# Patient Record
Sex: MALE | Race: WHITE | Employment: OTHER | URBAN - NONMETROPOLITAN AREA
[De-identification: names, ages, dates, MRNs, and addresses within clinical notes are randomized per-mention and may not be internally consistent; named-entity substitution may affect disease eponyms.]

---

## 2017-01-09 ENCOUNTER — HOSPITAL ENCOUNTER (OUTPATIENT)
Dept: LAB | Age: 77
Discharge: HOME | End: 2017-01-31
Attending: INTERNAL MEDICINE | Admitting: INTERNAL MEDICINE

## 2017-01-09 LAB
ALBUMIN SERPL-MCNC: 3.8 GM/DL (ref 3.4–5)
ALP BLD-CCNC: 59 IU/L (ref 40–129)
ALT SERPL-CCNC: 17 U/L (ref 10–40)
AST SERPL-CCNC: 16 IU/L (ref 15–37)
BASOPHILS ABSOLUTE: 0.1 K/CU MM
BASOPHILS RELATIVE PERCENT: 1.1 % (ref 0–1)
BILIRUB SERPL-MCNC: 0.3 MG/DL (ref 0–1)
BILIRUBIN DIRECT: 0.2 MG/DL (ref 0–0.3)
BILIRUBIN, INDIRECT: 0.1 MG/DL (ref 0–0.7)
CREAT SERPL-MCNC: 1.3 MG/DL (ref 0.9–1.3)
DIFFERENTIAL TYPE: ABNORMAL
EOSINOPHILS ABSOLUTE: 0.4 K/CU MM
EOSINOPHILS RELATIVE PERCENT: 5 % (ref 0–3)
GFR AFRICAN AMERICAN: >60 ML/MIN/1.73M2
GFR NON-AFRICAN AMERICAN: 53 ML/MIN/1.73M2
HCT VFR BLD CALC: 41.5 % (ref 42–52)
HEMOGLOBIN: 13.1 GM/DL (ref 13.5–18)
IMMATURE NEUTROPHIL %: 0.3 % (ref 0–0.43)
LYMPHOCYTES ABSOLUTE: 1.2 K/CU MM
LYMPHOCYTES RELATIVE PERCENT: 17.4 % (ref 24–44)
MCH RBC QN AUTO: 31.8 PG (ref 27–31)
MCHC RBC AUTO-ENTMCNC: 31.6 % (ref 32–36)
MCV RBC AUTO: 100.7 FL (ref 78–100)
MONOCYTES ABSOLUTE: 0.6 K/CU MM
MONOCYTES RELATIVE PERCENT: 8.2 % (ref 0–4)
PDW BLD-RTO: 13.6 % (ref 11.7–14.9)
PLATELET # BLD: 157 K/CU MM (ref 140–440)
PMV BLD AUTO: 9.8 FL (ref 7.5–11.1)
RBC # BLD: 4.12 M/CU MM (ref 4.6–6.2)
SEGMENTED NEUTROPHILS ABSOLUTE COUNT: 4.8 K/CU MM
SEGMENTED NEUTROPHILS RELATIVE PERCENT: 68 % (ref 36–66)
TOTAL IMMATURE NEUTOROPHIL: 0.02 K/CU MM
TOTAL PROTEIN: 6.8 GM/DL (ref 6.4–8.2)
WBC # BLD: 7.1 K/CU MM (ref 4–10.5)

## 2017-02-01 ENCOUNTER — HOSPITAL ENCOUNTER (OUTPATIENT)
Dept: LAB | Age: 77
Discharge: HOME | End: 2017-02-28
Attending: INTERNAL MEDICINE | Admitting: INTERNAL MEDICINE

## 2017-03-03 ENCOUNTER — HOSPITAL ENCOUNTER (OUTPATIENT)
Dept: LAB | Age: 77
Discharge: HOME | End: 2017-03-31
Attending: INTERNAL MEDICINE | Admitting: INTERNAL MEDICINE

## 2017-03-03 LAB
ALBUMIN SERPL-MCNC: 3.8 GM/DL (ref 3.4–5)
ALP BLD-CCNC: 62 IU/L (ref 40–129)
ALT SERPL-CCNC: 14 U/L (ref 10–40)
AST SERPL-CCNC: 18 IU/L (ref 15–37)
BASOPHILS ABSOLUTE: 0.1 K/CU MM
BASOPHILS RELATIVE PERCENT: 0.6 % (ref 0–1)
BILIRUB SERPL-MCNC: 0.7 MG/DL (ref 0–1)
BILIRUBIN DIRECT: 0.2 MG/DL (ref 0–0.3)
BILIRUBIN, INDIRECT: 0.5 MG/DL (ref 0–0.7)
CREAT SERPL-MCNC: 1.1 MG/DL (ref 0.9–1.3)
DIFFERENTIAL TYPE: ABNORMAL
EOSINOPHILS ABSOLUTE: 0.3 K/CU MM
EOSINOPHILS RELATIVE PERCENT: 3.2 % (ref 0–3)
GFR AFRICAN AMERICAN: >60 ML/MIN/1.73M2
GFR NON-AFRICAN AMERICAN: >60 ML/MIN/1.73M2
HCT VFR BLD CALC: 42.8 % (ref 42–52)
HEMOGLOBIN: 13.4 GM/DL (ref 13.5–18)
IMMATURE NEUTROPHIL %: 0.4 % (ref 0–0.43)
LYMPHOCYTES ABSOLUTE: 1 K/CU MM
LYMPHOCYTES RELATIVE PERCENT: 11.1 % (ref 24–44)
MCH RBC QN AUTO: 30.7 PG (ref 27–31)
MCHC RBC AUTO-ENTMCNC: 31.3 % (ref 32–36)
MCV RBC AUTO: 98.2 FL (ref 78–100)
MONOCYTES ABSOLUTE: 0.7 K/CU MM
MONOCYTES RELATIVE PERCENT: 8 % (ref 0–4)
PDW BLD-RTO: 13.5 % (ref 11.7–14.9)
PLATELET # BLD: 163 K/CU MM (ref 140–440)
PMV BLD AUTO: 9.9 FL (ref 7.5–11.1)
RBC # BLD: 4.36 M/CU MM (ref 4.6–6.2)
SEGMENTED NEUTROPHILS ABSOLUTE COUNT: 6.6 K/CU MM
SEGMENTED NEUTROPHILS RELATIVE PERCENT: 76.7 % (ref 36–66)
TOTAL IMMATURE NEUTOROPHIL: 0.03 K/CU MM
TOTAL PROTEIN: 6.7 GM/DL (ref 6.4–8.2)
WBC # BLD: 8.5 K/CU MM (ref 4–10.5)

## 2017-04-01 ENCOUNTER — HOSPITAL ENCOUNTER (OUTPATIENT)
Dept: LAB | Age: 77
Discharge: HOME | End: 2017-04-30
Attending: INTERNAL MEDICINE | Admitting: INTERNAL MEDICINE

## 2017-04-10 LAB
ALBUMIN SERPL-MCNC: 3.7 GM/DL (ref 3.4–5)
ALP BLD-CCNC: 57 IU/L (ref 40–129)
ALT SERPL-CCNC: 15 U/L (ref 10–40)
AST SERPL-CCNC: 16 IU/L (ref 15–37)
BASOPHILS ABSOLUTE: 0.1 K/CU MM
BASOPHILS RELATIVE PERCENT: 0.6 % (ref 0–1)
BILIRUB SERPL-MCNC: 0.4 MG/DL (ref 0–1)
BILIRUBIN DIRECT: 0.2 MG/DL (ref 0–0.3)
BILIRUBIN, INDIRECT: 0.2 MG/DL (ref 0–0.7)
CREAT SERPL-MCNC: 1.1 MG/DL (ref 0.9–1.3)
DIFFERENTIAL TYPE: ABNORMAL
EOSINOPHILS ABSOLUTE: 0.2 K/CU MM
EOSINOPHILS RELATIVE PERCENT: 2.9 % (ref 0–3)
GFR AFRICAN AMERICAN: >60 ML/MIN/1.73M2
GFR NON-AFRICAN AMERICAN: >60 ML/MIN/1.73M2
HCT VFR BLD CALC: 43.2 % (ref 42–52)
HEMOGLOBIN: 13.6 GM/DL (ref 13.5–18)
IMMATURE NEUTROPHIL %: 0.6 % (ref 0–0.43)
LYMPHOCYTES ABSOLUTE: 0.9 K/CU MM
LYMPHOCYTES RELATIVE PERCENT: 11.3 % (ref 24–44)
MCH RBC QN AUTO: 31 PG (ref 27–31)
MCHC RBC AUTO-ENTMCNC: 31.5 % (ref 32–36)
MCV RBC AUTO: 98.4 FL (ref 78–100)
MONOCYTES ABSOLUTE: 0.5 K/CU MM
MONOCYTES RELATIVE PERCENT: 6.7 % (ref 0–4)
PDW BLD-RTO: 13.8 % (ref 11.7–14.9)
PLATELET # BLD: 144 K/CU MM (ref 140–440)
PMV BLD AUTO: 9.6 FL (ref 7.5–11.1)
RBC # BLD: 4.39 M/CU MM (ref 4.6–6.2)
SEGMENTED NEUTROPHILS ABSOLUTE COUNT: 6.1 K/CU MM
SEGMENTED NEUTROPHILS RELATIVE PERCENT: 77.9 % (ref 36–66)
TOTAL IMMATURE NEUTOROPHIL: 0.05 K/CU MM
TOTAL PROTEIN: 6.3 GM/DL (ref 6.4–8.2)
WBC # BLD: 7.9 K/CU MM (ref 4–10.5)

## 2017-05-01 ENCOUNTER — HOSPITAL ENCOUNTER (OUTPATIENT)
Dept: LAB | Age: 77
Discharge: HOME | End: 2017-05-31
Attending: INTERNAL MEDICINE | Admitting: INTERNAL MEDICINE

## 2017-07-07 ENCOUNTER — HOSPITAL ENCOUNTER (OUTPATIENT)
Dept: LAB | Age: 77
Discharge: HOME | End: 2017-07-31
Attending: INTERNAL MEDICINE | Admitting: INTERNAL MEDICINE

## 2017-07-07 ENCOUNTER — HOSPITAL ENCOUNTER (OUTPATIENT)
Dept: LAB | Age: 77
Discharge: HOME | End: 2017-07-07
Attending: FAMILY MEDICINE | Admitting: FAMILY MEDICINE

## 2017-07-07 LAB
ALBUMIN SERPL-MCNC: 3.7 GM/DL (ref 3.4–5)
ALP BLD-CCNC: 58 IU/L (ref 40–129)
ALT SERPL-CCNC: 21 U/L (ref 10–40)
AST SERPL-CCNC: 21 IU/L (ref 15–37)
BASOPHILS ABSOLUTE: 0 K/CU MM
BASOPHILS RELATIVE PERCENT: 0.4 % (ref 0–1)
BILIRUB SERPL-MCNC: 0.5 MG/DL (ref 0–1)
BILIRUBIN DIRECT: 0.2 MG/DL (ref 0–0.3)
BILIRUBIN, INDIRECT: 0.3 MG/DL (ref 0–0.7)
CREAT SERPL-MCNC: 1.2 MG/DL (ref 0.9–1.3)
DIFFERENTIAL TYPE: ABNORMAL
EOSINOPHILS ABSOLUTE: 0.2 K/CU MM
EOSINOPHILS RELATIVE PERCENT: 2.9 % (ref 0–3)
GFR AFRICAN AMERICAN: >60 ML/MIN/1.73M2
GFR NON-AFRICAN AMERICAN: 58 ML/MIN/1.73M2
HCT VFR BLD CALC: 41.5 % (ref 42–52)
HEMOGLOBIN: 13.2 GM/DL (ref 13.5–18)
IMMATURE NEUTROPHIL %: 0.6 % (ref 0–0.43)
LYMPHOCYTES ABSOLUTE: 0.5 K/CU MM
LYMPHOCYTES RELATIVE PERCENT: 9.4 % (ref 24–44)
MCH RBC QN AUTO: 31.2 PG (ref 27–31)
MCHC RBC AUTO-ENTMCNC: 31.8 % (ref 32–36)
MCV RBC AUTO: 98.1 FL (ref 78–100)
MONOCYTES ABSOLUTE: 0.5 K/CU MM
MONOCYTES RELATIVE PERCENT: 9.4 % (ref 0–4)
PDW BLD-RTO: 14 % (ref 11.7–14.9)
PLATELET # BLD: 118 K/CU MM (ref 140–440)
PMV BLD AUTO: 10.6 FL (ref 7.5–11.1)
PROSTATE SPECIFIC ANTIGEN: 0.82 NG/ML (ref 0–4)
RBC # BLD: 4.23 M/CU MM (ref 4.6–6.2)
SEGMENTED NEUTROPHILS ABSOLUTE COUNT: 4.2 K/CU MM
SEGMENTED NEUTROPHILS RELATIVE PERCENT: 77.3 % (ref 36–66)
TOTAL IMMATURE NEUTOROPHIL: 0.03 K/CU MM
TOTAL PROTEIN: 6.6 GM/DL (ref 6.4–8.2)
WBC # BLD: 5.4 K/CU MM (ref 4–10.5)

## 2017-08-01 ENCOUNTER — HOSPITAL ENCOUNTER (OUTPATIENT)
Dept: LAB | Age: 77
Discharge: HOME | End: 2017-08-31
Attending: INTERNAL MEDICINE | Admitting: INTERNAL MEDICINE

## 2017-09-19 ENCOUNTER — HOSPITAL ENCOUNTER (OUTPATIENT)
Dept: LAB | Age: 77
Discharge: HOME | End: 2017-09-30
Attending: INTERNAL MEDICINE | Admitting: INTERNAL MEDICINE

## 2017-09-19 LAB
ALBUMIN SERPL-MCNC: 3.8 GM/DL (ref 3.4–5)
ALP BLD-CCNC: 62 IU/L (ref 40–129)
ALT SERPL-CCNC: 19 U/L (ref 10–40)
AST SERPL-CCNC: 21 IU/L (ref 15–37)
BASOPHILS ABSOLUTE: 0.1 K/CU MM
BASOPHILS RELATIVE PERCENT: 0.7 % (ref 0–1)
BILIRUB SERPL-MCNC: 0.5 MG/DL (ref 0–1)
BILIRUBIN DIRECT: 0.2 MG/DL (ref 0–0.3)
BILIRUBIN, INDIRECT: 0.3 MG/DL (ref 0–0.7)
CREAT SERPL-MCNC: 1.1 MG/DL (ref 0.9–1.3)
DIFFERENTIAL TYPE: ABNORMAL
EOSINOPHILS ABSOLUTE: 0.5 K/CU MM
EOSINOPHILS RELATIVE PERCENT: 6.6 % (ref 0–3)
GFR AFRICAN AMERICAN: >60 ML/MIN/1.73M2
GFR NON-AFRICAN AMERICAN: >60 ML/MIN/1.73M2
HCT VFR BLD CALC: 41.1 % (ref 42–52)
HEMOGLOBIN: 13 GM/DL (ref 13.5–18)
IMMATURE NEUTROPHIL %: 0.4 % (ref 0–0.43)
LYMPHOCYTES ABSOLUTE: 0.8 K/CU MM
LYMPHOCYTES RELATIVE PERCENT: 10.4 % (ref 24–44)
MCH RBC QN AUTO: 31 PG (ref 27–31)
MCHC RBC AUTO-ENTMCNC: 31.6 % (ref 32–36)
MCV RBC AUTO: 98.1 FL (ref 78–100)
MONOCYTES ABSOLUTE: 0.6 K/CU MM
MONOCYTES RELATIVE PERCENT: 7.5 % (ref 0–4)
PDW BLD-RTO: 14 % (ref 11.7–14.9)
PLATELET # BLD: 135 K/CU MM (ref 140–440)
PMV BLD AUTO: 10.2 FL (ref 7.5–11.1)
RBC # BLD: 4.19 M/CU MM (ref 4.6–6.2)
SEGMENTED NEUTROPHILS ABSOLUTE COUNT: 5.6 K/CU MM
SEGMENTED NEUTROPHILS RELATIVE PERCENT: 74.4 % (ref 36–66)
TOTAL IMMATURE NEUTOROPHIL: 0.03 K/CU MM
TOTAL PROTEIN: 6.8 GM/DL (ref 6.4–8.2)
WBC # BLD: 7.6 K/CU MM (ref 4–10.5)

## 2017-10-01 ENCOUNTER — HOSPITAL ENCOUNTER (OUTPATIENT)
Dept: LAB | Age: 77
Discharge: HOME | End: 2017-10-31
Attending: INTERNAL MEDICINE | Admitting: INTERNAL MEDICINE

## 2017-12-20 ENCOUNTER — HOSPITAL ENCOUNTER (OUTPATIENT)
Dept: LAB | Age: 77
Discharge: HOME | End: 2017-12-31
Attending: INTERNAL MEDICINE | Admitting: INTERNAL MEDICINE

## 2017-12-20 LAB
ALBUMIN SERPL-MCNC: 3.9 GM/DL (ref 3.4–5)
ALP BLD-CCNC: 61 IU/L (ref 40–129)
ALT SERPL-CCNC: 15 U/L (ref 10–40)
AST SERPL-CCNC: 18 IU/L (ref 15–37)
BASOPHILS ABSOLUTE: 0.1 K/CU MM
BASOPHILS RELATIVE PERCENT: 1 % (ref 0–1)
BILIRUB SERPL-MCNC: 0.6 MG/DL (ref 0–1)
BILIRUBIN DIRECT: 0.2 MG/DL (ref 0–0.3)
BILIRUBIN, INDIRECT: 0.4 MG/DL (ref 0–0.7)
CREAT SERPL-MCNC: 1.1 MG/DL (ref 0.9–1.3)
DIFFERENTIAL TYPE: ABNORMAL
EOSINOPHILS ABSOLUTE: 0.5 K/CU MM
EOSINOPHILS RELATIVE PERCENT: 6.3 % (ref 0–3)
GFR AFRICAN AMERICAN: >60 ML/MIN/1.73M2
GFR NON-AFRICAN AMERICAN: >60 ML/MIN/1.73M2
HCT VFR BLD CALC: 43 % (ref 42–52)
HEMOGLOBIN: 13.9 GM/DL (ref 13.5–18)
IMMATURE NEUTROPHIL %: 0.3 % (ref 0–0.43)
LYMPHOCYTES ABSOLUTE: 1.1 K/CU MM
LYMPHOCYTES RELATIVE PERCENT: 15.1 % (ref 24–44)
MCH RBC QN AUTO: 31.4 PG (ref 27–31)
MCHC RBC AUTO-ENTMCNC: 32.3 % (ref 32–36)
MCV RBC AUTO: 97.1 FL (ref 78–100)
MONOCYTES ABSOLUTE: 0.6 K/CU MM
MONOCYTES RELATIVE PERCENT: 7.7 % (ref 0–4)
PDW BLD-RTO: 13.7 % (ref 11.7–14.9)
PLATELET # BLD: 148 K/CU MM (ref 140–440)
PMV BLD AUTO: 10.4 FL (ref 7.5–11.1)
RBC # BLD: 4.43 M/CU MM (ref 4.6–6.2)
SEGMENTED NEUTROPHILS ABSOLUTE COUNT: 5.1 K/CU MM
SEGMENTED NEUTROPHILS RELATIVE PERCENT: 69.6 % (ref 36–66)
TOTAL IMMATURE NEUTOROPHIL: 0.02 K/CU MM
TOTAL PROTEIN: 7 GM/DL (ref 6.4–8.2)
WBC # BLD: 7.3 K/CU MM (ref 4–10.5)

## 2018-01-01 ENCOUNTER — HOSPITAL ENCOUNTER (OUTPATIENT)
Dept: LAB | Age: 78
Discharge: HOME | End: 2018-01-31
Attending: INTERNAL MEDICINE | Admitting: INTERNAL MEDICINE

## 2018-06-15 ENCOUNTER — HOSPITAL ENCOUNTER (OUTPATIENT)
Dept: GENERAL RADIOLOGY | Age: 78
Discharge: HOME | End: 2018-06-15
Attending: ORTHOPAEDIC SURGERY | Admitting: ORTHOPAEDIC SURGERY

## 2018-06-15 DIAGNOSIS — M25.511 ACUTE PAIN OF RIGHT SHOULDER: ICD-10-CM

## 2019-02-13 ENCOUNTER — HOSPITAL ENCOUNTER (OUTPATIENT)
Age: 79
Discharge: HOME | End: 2019-02-13
Payer: MEDICARE

## 2019-02-13 LAB
ALBUMIN SERPL-MCNC: 3.8 GM/DL (ref 3.4–5)
ALP BLD-CCNC: 52 IU/L (ref 40–129)
ALT SERPL-CCNC: 19 U/L (ref 10–40)
ANION GAP SERPL CALCULATED.3IONS-SCNC: 10 MMOL/L (ref 4–16)
AST SERPL-CCNC: 18 IU/L (ref 15–37)
BILIRUB SERPL-MCNC: 0.7 MG/DL (ref 0–1)
BUN BLDV-MCNC: 32 MG/DL (ref 6–23)
CALCIUM SERPL-MCNC: 9.2 MG/DL (ref 8.3–10.6)
CHLORIDE BLD-SCNC: 106 MMOL/L (ref 99–110)
CHOLESTEROL, FASTING: 153 MG/DL
CO2: 29 MMOL/L (ref 21–32)
CREAT SERPL-MCNC: 1.1 MG/DL (ref 0.9–1.3)
GFR AFRICAN AMERICAN: >60 ML/MIN/1.73M2
GFR NON-AFRICAN AMERICAN: >60 ML/MIN/1.73M2
GLUCOSE FASTING: 83 MG/DL (ref 70–99)
HDLC SERPL-MCNC: 40 MG/DL
LDL CHOLESTEROL DIRECT: 102 MG/DL
POTASSIUM SERPL-SCNC: 4.4 MMOL/L (ref 3.5–5.1)
SODIUM BLD-SCNC: 145 MMOL/L (ref 135–145)
TOTAL PROTEIN: 6.3 GM/DL (ref 6.4–8.2)
TRIGLYCERIDE, FASTING: 104 MG/DL
TSH HIGH SENSITIVITY: 1.56 UIU/ML (ref 0.27–4.2)

## 2019-02-13 PROCEDURE — 80053 COMPREHEN METABOLIC PANEL: CPT

## 2019-02-13 PROCEDURE — 84443 ASSAY THYROID STIM HORMONE: CPT

## 2019-02-13 PROCEDURE — 36415 COLL VENOUS BLD VENIPUNCTURE: CPT

## 2019-02-13 PROCEDURE — 80061 LIPID PANEL: CPT

## 2019-05-29 ENCOUNTER — HOSPITAL ENCOUNTER (OUTPATIENT)
Dept: SLEEP CENTER | Age: 79
Discharge: HOME | End: 2019-05-29
Payer: MEDICARE

## 2019-05-29 VITALS
HEIGHT: 68 IN | HEART RATE: 75 BPM | SYSTOLIC BLOOD PRESSURE: 124 MMHG | DIASTOLIC BLOOD PRESSURE: 67 MMHG | BODY MASS INDEX: 30.31 KG/M2 | OXYGEN SATURATION: 98 % | WEIGHT: 200 LBS

## 2019-05-29 DIAGNOSIS — G47.33 OSA (OBSTRUCTIVE SLEEP APNEA): Primary | ICD-10-CM

## 2019-05-29 PROCEDURE — 99211 OFF/OP EST MAY X REQ PHY/QHP: CPT | Performed by: PSYCHIATRY & NEUROLOGY

## 2019-05-29 ASSESSMENT — SLEEP AND FATIGUE QUESTIONNAIRES
HOW LIKELY ARE YOU TO NOD OFF OR FALL ASLEEP WHILE WATCHING TV: 2
HOW LIKELY ARE YOU TO NOD OFF OR FALL ASLEEP WHILE SITTING AND READING: 2
HOW LIKELY ARE YOU TO NOD OFF OR FALL ASLEEP WHILE LYING DOWN TO REST IN THE AFTERNOON WHEN CIRCUMSTANCES PERMIT: 3
HOW LIKELY ARE YOU TO NOD OFF OR FALL ASLEEP WHILE SITTING INACTIVE IN A PUBLIC PLACE: 2
HOW LIKELY ARE YOU TO NOD OFF OR FALL ASLEEP WHILE SITTING AND TALKING TO SOMEONE: 1
HOW LIKELY ARE YOU TO NOD OFF OR FALL ASLEEP WHILE SITTING QUIETLY AFTER LUNCH WITHOUT ALCOHOL: 2
HOW LIKELY ARE YOU TO NOD OFF OR FALL ASLEEP IN A CAR, WHILE STOPPED FOR A FEW MINUTES IN TRAFFIC: 1
ESS TOTAL SCORE: 16
HOW LIKELY ARE YOU TO NOD OFF OR FALL ASLEEP WHEN YOU ARE A PASSENGER IN A CAR FOR AN HOUR WITHOUT A BREAK: 3

## 2019-05-29 NOTE — PROGRESS NOTES
Chandana Vega MD, Nabil Coley MD, Colonel Raza POPE, Gemma Huang MD, [facility]      [address], [address] [facility]   [place] [address]: [phone]  F: [phone]     Subjective:     Patient ID: Kelly Tyler is a 78 y.o. male, referred to the sleep center for   Chief Complaint   Patient presents with    Sleep Apnea   . Referring Tania Negro     History:  Snoring, apnea EDS 25-30 yrs. Had PSG 20 yrs ago. Was given CPAP. It worked; slept better. Was not v sleepy during day. His CPAP machine broke 6 wks ago; it is not working.      Social History     Socioeconomic History    Marital status:      Spouse name: Not on file    Number of children: Not on file    Years of education: Not on file    Highest education level: Not on file   Occupational History    Not on file   Social Needs    Financial resource strain: Not on file    Food insecurity:     Worry: Not on file     Inability: Not on file    Transportation needs:     Medical: Not on file     Non-medical: Not on file   Tobacco Use    Smoking status: Never Smoker    Smokeless tobacco: Never Used   Substance and Sexual Activity    Alcohol use: No    Drug use: Not on file    Sexual activity: Not on file   Lifestyle    Physical activity:     Days per week: Not on file     Minutes per session: Not on file    Stress: Not on file   Relationships    Social connections:     Talks on phone: Not on file     Gets together: Not on file     Attends Nondenominational service: Not on file     Active member of club or organization: Not on file     Attends meetings of clubs or organizations: Not on file     Relationship status: Not on file    Intimate partner violence:     Fear of current or ex partner: Not on file     Emotionally abused: Not on file     Physically abused: Not on file     Forced sexual activity: Not on file   Other Topics Concern    Not on file   Social History Narrative    Not on file       Prior to Admission medications    Medication Sig Start Date End Date Taking? Authorizing Provider   HYDROcodone-acetaminophen (NORCO) 5-325 MG per tablet Take 1-2 tablets by mouth every 6 hours as needed for Pain . 7/7/17  Yes Krzysztof Alvarenga DO   tamsulosin (FLOMAX) 0.4 MG capsule Take 0.4 mg by mouth daily   Yes Historical Provider, MD   predniSONE (DELTASONE) 5 MG tablet Take 5 mg by mouth daily   Yes Historical Provider, MD   folic acid (FOLVITE) 1 MG tablet Take 1 mg by mouth daily. Yes Historical Provider, MD   amLODIPine-benazepril (LOTREL) 5-10 MG per capsule Take 1 capsule by mouth daily. Yes Historical Provider, MD   montelukast (SINGULAIR) 10 MG tablet Take 10 mg by mouth nightly. Yes Historical Provider, MD   mometasone (NASONEX) 50 MCG/ACT nasal spray 2 sprays by Nasal route daily. Yes Historical Provider, MD   Ascorbic Acid (VITAMIN C) 500 MG tablet Take 1,000 mg by mouth daily. Yes Historical Provider, MD   Cholecalciferol (VITAMIN D3) 2000 UNITS CAPS Take 1 tablet by mouth. Yes Historical Provider, MD   ammonium lactate (AMLACTIN) 12 % cream Apply  topically as needed. Apply topically as needed. Yes Historical Provider, MD   Calcipotriene-Betameth Diprop (TACLONEX EX) Apply  topically. Applied Saturday and Sunday only    Yes Historical Provider, MD   [facility] Natural Products (OSTEO BI-FLEX JOINT SHIELD PO) Take 2 tablets by mouth daily. Yes Historical Provider, MD   citalopram (CELEXA) 10 MG tablet Take 10 mg by mouth daily    Historical Provider, MD   citalopram (CELEXA) 20 MG tablet  6/28/17   Historical Provider, MD   famotidine (PEPCID) 20 MG tablet Take 1 tablet by mouth 2 times daily for 7 days. 4/17/13 4/24/13  [address] [place] [address] Drive, MD   aspirin 81 MG EC tablet Take 81 mg by mouth daily. Historical Provider, MD   methotrexate 2.5 MG tablet Take 7.5 mg by mouth once a week.       Historical Provider, MD lactase (LACTAID) 3000 UNITS tablet Take 1 tablet by mouth as needed. Historical Provider, MD   ketoconazole (NIZORAL) 2 % shampoo Apply  topically once a week. Apply topically daily as needed. 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 Objective:     Vitals:    05/29/19 1050   BP: 124/67   Pulse: 75   SpO2: 98%   Weight: 200 lb (90.7 kg)   Height: 5' 8\" (1.727 m)     Neck circumference: 16  Inches  Vancleave - Total score: 16    Gen: No distress. Eyes: PERRL. No sclera icterus. No conjunctival injection. ENT: No discharge. Pharynx clear. External appearance of ears and nose normal.  Neck: Trachea midline. No obvious mass. Resp: No accessory muscle use. No crackles. No wheezes. No rhonchi. No dullness on percussion. CV: Regular rate. Regular rhythm. No murmur or rub. No edema. GI: Non-tender. Non-distended. No hernia. Skin: Warm, dry, normal texture and turgor. No nodule on exposed extremities. Lymph: No cervical LAD. No supraclavicular LAD. M/S: No cyanosis. No clubbing. No joint deformity. Psych: Oriented x 3. No anxiety. Awake. Alert. Intact judgement and insight.     Mallampati Airway Classification:   []1 []2 []3 [x]4        Sleep Complaints/Symptoms:    Normal Bedtime:      Normal Wake Time:   Average Sleep Time:  7-8 hrs    Number of Awakenings: 2-4    Duration of Sleep Complaints: 25-30  years    [x] Snoring     [] Improved [] Not Improved    [] Choking/Gasping for Breath  [] Improved [] Not Improved       [x] Witnessed Apneas              [] Improved [] Not Improved  [x] Daytime Sleepiness             [] Improved [] Not Improved  [] Morning Headaches    [] Improved [] Not Improved  [] Frequent Awakenings       [] Improved [] Not Improved  [] Jerky Movements   [] Improved [] Not Improved   [] Restless Legs   [] Improved [] Not Improved   [] Difficulty Initiating Sleep  [] Improved [] Not Improved   [] Difficulty Maintaining Sleep  [] Improved [] Not Improved   [] Restless Sleep    [] Improved [] Not Improved   [] Sleep Paralysis    [] Improved [] Not Improved   [] Muscle Weakness w/ Emotion  [] Improved [] Not Improved  [] Other :     CPAP Usage:    []  Patient has never worn CPAP  [x]  Patient has worn CPAP previously but discontinued use; machinebroke  []  Current PAP user,  [years]   []  Patient Tolerates Well   []  Patient Does Not Tolerate     []  Patient Uses CPAP      []  More Than 4 Hours      []  Less Than 4 Hours  []  CPAP/BPAP/ASV Pressure Readings   []  CPAP Pressure      cm H20   []  BPAP Pressure       cm H20   []  ASV Pressure         cm H20      Assessment:      Diagnosis:     ALFREDA  Hypersomnia    Patient Active Problem List    Diagnosis Date Noted    Abdominal aortic aneurysm without rupture ([facility] [address] 08/10/2016     Plan:        Sleep Study:     [x]  Sleep hygiene/ relaxation methods & CBTi principles review with patient     []  HST - Home Sleep Study   []  PSG - Overnight Diagnostic Polysomnogram     [x]  CPAP Titration    [x] Split Night Study    [] BiLevel Titration    [] ASV - Auto-Servo Ventilation Titration       []  MSLT - Multiple Sleep Latency Test   []  MWT - Maintenance of Wakefulness Test    CPAP Therapy:     []  Patient to be seen for new mask fitting/desensitization   []  AutoPAP Titration    []  CPAP supplies and equipment at ________cmH2O    []  Continue same CPAP pressure   []  Change CPAP pressure to _______cm H2O   []  CPAP supplies only, no pressure change   []  Refer for an oral appliance       Medications:       []  Continue current medication    []  Add Medication:  ________________    Follow-Up:     []  No follow up required. Patient to return as needed. []  2 weeks   []  4 weeks   []  2 months   []  4 months   []  6 months   []  1 year for CPAP compliance evaluation. Patient to return sooner, as needed. [x]  Follow up after sleep study   []  Other: ______________    No orders of the defined types were placed in this encounter.          Electronically signed by Liv Amador MD on 5/29/2019 at 11:18 AM

## 2019-05-31 ENCOUNTER — HOSPITAL ENCOUNTER (OUTPATIENT)
Dept: SLEEP CENTER | Age: 79
Discharge: HOME | End: 2019-05-31
Payer: MEDICARE

## 2019-05-31 PROCEDURE — 95810 POLYSOM 6/> YRS 4/> PARAM: CPT

## 2019-06-06 NOTE — PROGRESS NOTES
Results for the most recent sleep study on Gemini Gonzalez  1940 are finalized and available. Please see media tab.     Electronically signed by Gato Bradshaw on 6/6/2019 at 11:36 AM

## 2019-06-12 ENCOUNTER — HOSPITAL ENCOUNTER (OUTPATIENT)
Dept: SLEEP CENTER | Age: 79
Discharge: HOME | End: 2019-06-12
Payer: MEDICARE

## 2019-06-12 PROCEDURE — 9990000010 HC NO CHARGE VISIT: Performed by: PSYCHIATRY & NEUROLOGY

## 2019-06-12 RX ORDER — GABAPENTIN 600 MG/1
600 TABLET ORAL 3 TIMES DAILY
COMMUNITY
Start: 2019-05-07 | End: 2020-09-08 | Stop reason: ALTCHOICE

## 2019-07-19 ENCOUNTER — APPOINTMENT (OUTPATIENT)
Dept: GENERAL RADIOLOGY | Age: 79
End: 2019-07-19
Payer: MEDICARE

## 2019-07-19 ENCOUNTER — HOSPITAL ENCOUNTER (OUTPATIENT)
Age: 79
Discharge: HOME | End: 2019-07-19
Payer: MEDICARE

## 2019-07-19 ENCOUNTER — APPOINTMENT (OUTPATIENT)
Dept: CT IMAGING | Age: 79
End: 2019-07-19
Payer: MEDICARE

## 2019-07-19 ENCOUNTER — HOSPITAL ENCOUNTER (EMERGENCY)
Age: 79
Discharge: HOME | End: 2019-07-19
Attending: EMERGENCY MEDICINE
Payer: MEDICARE

## 2019-07-19 VITALS
HEART RATE: 62 BPM | WEIGHT: 170 LBS | TEMPERATURE: 97.2 F | BODY MASS INDEX: 25.76 KG/M2 | HEIGHT: 68 IN | RESPIRATION RATE: 18 BRPM | DIASTOLIC BLOOD PRESSURE: 65 MMHG | OXYGEN SATURATION: 98 % | SYSTOLIC BLOOD PRESSURE: 141 MMHG

## 2019-07-19 DIAGNOSIS — S09.90XA CLOSED HEAD INJURY, INITIAL ENCOUNTER: Primary | ICD-10-CM

## 2019-07-19 DIAGNOSIS — S20.211A CONTUSION OF RIB ON RIGHT SIDE, INITIAL ENCOUNTER: ICD-10-CM
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
## 2019-07-19 DIAGNOSIS — S80.00XA CONTUSION OF KNEE, UNSPECIFIED LATERALITY, INITIAL ENCOUNTER: ICD-10-CM

## 2019-07-19 LAB — PROSTATE SPECIFIC ANTIGEN: 0.8 NG/ML (ref 0–4)

## 2019-07-19 PROCEDURE — 70450 CT HEAD/BRAIN W/O DYE: CPT

## 2019-07-19 PROCEDURE — 36415 COLL VENOUS BLD VENIPUNCTURE: CPT

## 2019-07-19 PROCEDURE — G0103 PSA SCREENING: HCPCS

## 2019-07-19 PROCEDURE — 71046 X-RAY EXAM CHEST 2 VIEWS: CPT

## 2019-07-19 PROCEDURE — 71100 X-RAY EXAM RIBS UNI 2 VIEWS: CPT

## 2019-07-19 PROCEDURE — 99284 EMERGENCY DEPT VISIT MOD MDM: CPT

## 2019-07-19 PROCEDURE — 73560 X-RAY EXAM OF KNEE 1 OR 2: CPT

## 2019-07-19 ASSESSMENT — ENCOUNTER SYMPTOMS
EYES NEGATIVE: 1
BOWEL INCONTINENCE: 0
NAUSEA: 0
VISUAL CHANGE: 0
ABDOMINAL PAIN: 0
RESPIRATORY NEGATIVE: 1
GASTROINTESTINAL NEGATIVE: 1
VOMITING: 0

## 2019-07-19 ASSESSMENT — PAIN DESCRIPTION - ORIENTATION: ORIENTATION: RIGHT

## 2019-07-19 ASSESSMENT — PAIN SCALES - GENERAL: PAINLEVEL_OUTOF10: 7

## 2019-07-19 ASSESSMENT — PAIN DESCRIPTION - PAIN TYPE: TYPE: ACUTE PAIN

## 2019-07-19 ASSESSMENT — PAIN DESCRIPTION - LOCATION: LOCATION: RIB CAGE

## 2019-07-19 NOTE — ED PROVIDER NOTES
The history is provided by the patient and the spouse. Fall   The accident occurred 3 to 5 hours ago. The fall occurred while walking. He fell from a height of 1 to 2 ft. He landed on concrete. The point of impact was the right knee and head. The pain is at a severity of 3/10. He was ambulatory at the scene. There was no entrapment after the fall. There was no drug use involved in the accident. There was no alcohol use involved in the accident. Pertinent negatives include no visual change, no fever, no numbness, no abdominal pain, no bowel incontinence, no nausea, no vomiting, no hematuria, no headaches, no hearing loss, no loss of consciousness and no tingling. The symptoms are aggravated by activity. Review of Systems   Constitutional: Negative. Negative for fever. HENT: Negative. Eyes: Negative. Respiratory: Negative. Cardiovascular: Negative. Gastrointestinal: Negative. Negative for abdominal pain, bowel incontinence, nausea and vomiting. Genitourinary: Negative. Negative for hematuria. Musculoskeletal: Negative. Skin: Negative. Neurological: Negative. Negative for tingling, loss of consciousness, numbness and headaches. All other systems reviewed and are negative. History reviewed. No pertinent family history.   Social History     Socioeconomic History    Marital status:      Spouse name: Not on file    Number of children: Not on file    Years of education: Not on file    Highest education level: Not on file   Occupational History    Not on file   Social Needs    Financial resource strain: Not on file    Food insecurity:     Worry: Not on file     Inability: Not on file    Transportation needs:     Medical: Not on file     Non-medical: Not on file   Tobacco Use    Smoking status: Never Smoker    Smokeless tobacco: Never Used   Substance and Sexual Activity    Alcohol use: No    Drug use: Not on file    Sexual activity: Not on file   Lifestyle    Physical activity:     Days per week: Not on file     Minutes per session: Not on file    Stress: Not on file   Relationships    Social connections:     Talks on phone: Not on file     Gets together: Not on file     Attends Oriental orthodox service: Not on file     Active member of club or organization: Not on file     Attends meetings of clubs or organizations: Not on file     Relationship status: Not on file    Intimate partner violence:     Fear of current or ex partner: Not on file     Emotionally abused: Not on file     Physically abused: Not on file     Forced sexual activity: Not on file   Other Topics Concern    Not on file   Social History Narrative    Not on file     Past Surgical History:   Procedure Laterality Date    EPIDIDYMECTOMY      EYE SURGERY      lesion biopsy of right eye    EYE SURGERY      cataract extraction right eye   [address]    Umbilical hernis     Past Medical History:   Diagnosis Date    Arthritis     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 Authorizing Provider   gabapentin (NEURONTIN) 600 MG tablet 600 mg 3 times daily. 5/7/19   Historical Provider, MD   citalopram (CELEXA) 10 MG tablet Take 10 mg by mouth daily    Historical Provider, MD   tamsulosin (FLOMAX) 0.4 MG capsule Take 0.4 mg by mouth daily    Historical Provider, MD   famotidine (PEPCID) 20 MG tablet Take 1 tablet by mouth 2 times daily for 7 days. 4/17/13 4/24/13  [address] [place] [address] Drive, MD   folic acid (FOLVITE) 1 MG tablet Take 1 mg by mouth daily. Historical Provider, MD   amLODIPine-benazepril (LOTREL) 5-10 MG per capsule Take 1 capsule by mouth daily.       Historical Provider, MD   montelukast (SINGULAIR) 10 MG tablet Take 10 mg by mouth

## 2019-08-28 ENCOUNTER — HOSPITAL ENCOUNTER (OUTPATIENT)
Dept: SLEEP CENTER | Age: 79
Discharge: HOME | End: 2019-08-28
Payer: MEDICARE

## 2019-08-28 DIAGNOSIS — I71.40 ABDOMINAL AORTIC ANEURYSM WITHOUT RUPTURE: Primary | ICD-10-CM

## 2019-08-28 PROCEDURE — 9990000010 HC NO CHARGE VISIT: Performed by: PSYCHIATRY & NEUROLOGY

## 2019-08-28 ASSESSMENT — SLEEP AND FATIGUE QUESTIONNAIRES
HOW LIKELY ARE YOU TO NOD OFF OR FALL ASLEEP WHILE SITTING AND TALKING TO SOMEONE: 0
HOW LIKELY ARE YOU TO NOD OFF OR FALL ASLEEP IN A CAR, WHILE STOPPED FOR A FEW MINUTES IN TRAFFIC: 0
ESS TOTAL SCORE: 5
HOW LIKELY ARE YOU TO NOD OFF OR FALL ASLEEP WHILE WATCHING TV: 1
HOW LIKELY ARE YOU TO NOD OFF OR FALL ASLEEP WHILE SITTING QUIETLY AFTER LUNCH WITHOUT ALCOHOL: 0
HOW LIKELY ARE YOU TO NOD OFF OR FALL ASLEEP WHEN YOU ARE A PASSENGER IN A CAR FOR AN HOUR WITHOUT A BREAK: 0
HOW LIKELY ARE YOU TO NOD OFF OR FALL ASLEEP WHILE LYING DOWN TO REST IN THE AFTERNOON WHEN CIRCUMSTANCES PERMIT: 2
HOW LIKELY ARE YOU TO NOD OFF OR FALL ASLEEP WHILE SITTING AND READING: 1
HOW LIKELY ARE YOU TO NOD OFF OR FALL ASLEEP WHILE SITTING INACTIVE IN A PUBLIC PLACE: 1

## 2019-08-28 NOTE — PROGRESS NOTES
follow up required. Patient to return as needed. []  2 weeks   []  4 weeks   []  2 months   []  4 months   []  6 months   [x]  1 year for CPAP compliance evaluation. Patient to return sooner, as needed. []  Follow up after sleep study   []  Other: ______________    No orders of the defined types were placed in this encounter.          Electronically signed by Clem Roa MD on 8/28/2019 at 11:31 AM

## 2019-10-21 ENCOUNTER — HOSPITAL ENCOUNTER (OUTPATIENT)
Age: 79
Discharge: HOME | End: 2019-10-21
Payer: MEDICARE

## 2019-10-21 LAB
ALBUMIN SERPL-MCNC: 4.1 GM/DL (ref 3.4–5)
ALP BLD-CCNC: 80 IU/L (ref 40–129)
ALT SERPL-CCNC: 19 U/L (ref 10–40)
ANION GAP SERPL CALCULATED.3IONS-SCNC: 15 MMOL/L (ref 4–16)
AST SERPL-CCNC: 20 IU/L (ref 15–37)
BILIRUB SERPL-MCNC: 0.6 MG/DL (ref 0–1)
BUN BLDV-MCNC: 33 MG/DL (ref 6–23)
CALCIUM SERPL-MCNC: 9.9 MG/DL (ref 8.3–10.6)
CHLORIDE BLD-SCNC: 108 MMOL/L (ref 99–110)
CO2: 22 MMOL/L (ref 21–32)
CREAT SERPL-MCNC: 1.2 MG/DL (ref 0.9–1.3)
GFR AFRICAN AMERICAN: >60 ML/MIN/1.73M2
GFR NON-AFRICAN AMERICAN: 58 ML/MIN/1.73M2
GLUCOSE BLD-MCNC: 95 MG/DL (ref 70–99)
HCT VFR BLD CALC: 44.7 % (ref 42–52)
HEMOGLOBIN: 13.7 GM/DL (ref 13.5–18)
MCH RBC QN AUTO: 30.4 PG (ref 27–31)
MCHC RBC AUTO-ENTMCNC: 30.6 % (ref 32–36)
MCV RBC AUTO: 99.3 FL (ref 78–100)
PDW BLD-RTO: 14.4 % (ref 11.7–14.9)
PLATELET # BLD: 153 K/CU MM (ref 140–440)
PMV BLD AUTO: 9.9 FL (ref 7.5–11.1)
POTASSIUM SERPL-SCNC: 4.3 MMOL/L (ref 3.5–5.1)
RBC # BLD: 4.5 M/CU MM (ref 4.6–6.2)
SODIUM BLD-SCNC: 145 MMOL/L (ref 135–145)
TOTAL PROTEIN: 7.3 GM/DL (ref 6.4–8.2)
TSH HIGH SENSITIVITY: 1.05 UIU/ML (ref 0.27–4.2)
WBC # BLD: 8.5 K/CU MM (ref 4–10.5)

## 2019-10-21 PROCEDURE — 84466 ASSAY OF TRANSFERRIN: CPT

## 2019-10-21 PROCEDURE — 80053 COMPREHEN METABOLIC PANEL: CPT

## 2019-10-21 PROCEDURE — 80061 LIPID PANEL: CPT

## 2019-10-21 PROCEDURE — 83721 ASSAY OF BLOOD LIPOPROTEIN: CPT

## 2019-10-21 PROCEDURE — 36415 COLL VENOUS BLD VENIPUNCTURE: CPT

## 2019-10-21 PROCEDURE — 83540 ASSAY OF IRON: CPT

## 2019-10-21 PROCEDURE — 85027 COMPLETE CBC AUTOMATED: CPT

## 2019-10-21 PROCEDURE — 84443 ASSAY THYROID STIM HORMONE: CPT

## 2019-10-22 LAB
CHOLESTEROL: 148 MG/DL
HDLC SERPL-MCNC: 51 MG/DL
IRON: 92 UG/DL (ref 59–158)
LDL CHOLESTEROL DIRECT: 85 MG/DL
PCT TRANSFERRIN: 31 % (ref 10–44)
TOTAL IRON BINDING CAPACITY: 300 UG/DL (ref 250–450)
TRANSFERRIN: 259.3 MG/DL (ref 200–360)
TRIGL SERPL-MCNC: 96 MG/DL
UNSATURATED IRON BINDING CAPACITY: 208 UG/DL (ref 110–370)

## 2019-10-31 ENCOUNTER — HOSPITAL ENCOUNTER (OUTPATIENT)
Dept: PHYSICAL THERAPY | Age: 79
Setting detail: THERAPIES SERIES
Discharge: HOME | End: 2019-10-31
Payer: MEDICARE

## 2019-10-31 PROCEDURE — 97162 PT EVAL MOD COMPLEX 30 MIN: CPT

## 2019-10-31 PROCEDURE — 97116 GAIT TRAINING THERAPY: CPT

## 2019-10-31 PROCEDURE — 97110 THERAPEUTIC EXERCISES: CPT

## 2019-11-05 ENCOUNTER — HOSPITAL ENCOUNTER (OUTPATIENT)
Dept: PHYSICAL THERAPY | Age: 79
Setting detail: THERAPIES SERIES
Discharge: HOME | End: 2019-11-05
Payer: MEDICARE

## 2019-11-05 PROCEDURE — 97112 NEUROMUSCULAR REEDUCATION: CPT

## 2019-11-05 PROCEDURE — 97110 THERAPEUTIC EXERCISES: CPT

## 2019-11-06 ENCOUNTER — HOSPITAL ENCOUNTER (OUTPATIENT)
Dept: PHYSICAL THERAPY | Age: 79
Setting detail: THERAPIES SERIES
Discharge: HOME | End: 2019-11-06
Payer: MEDICARE

## 2019-11-06 PROCEDURE — 97110 THERAPEUTIC EXERCISES: CPT

## 2019-11-06 PROCEDURE — 97112 NEUROMUSCULAR REEDUCATION: CPT

## 2019-11-10 NOTE — PROGRESS NOTES
6/1/2019  sleep study  for Katelyn Haney  1940 is complete. Results are pending physician review.     Electronically signed by Cristi Araya on 6/1/2019 at 7:07 AM - - -

## 2019-11-12 ENCOUNTER — HOSPITAL ENCOUNTER (OUTPATIENT)
Dept: PHYSICAL THERAPY | Age: 79
Discharge: HOME | End: 2019-11-12

## 2019-11-14 ENCOUNTER — HOSPITAL ENCOUNTER (OUTPATIENT)
Dept: PHYSICAL THERAPY | Age: 79
Discharge: HOME | End: 2019-11-14

## 2019-11-19 ENCOUNTER — HOSPITAL ENCOUNTER (OUTPATIENT)
Dept: PHYSICAL THERAPY | Age: 79
Setting detail: THERAPIES SERIES
Discharge: HOME | End: 2019-11-19
Payer: MEDICARE

## 2019-11-19 PROCEDURE — 97112 NEUROMUSCULAR REEDUCATION: CPT

## 2019-11-21 ENCOUNTER — HOSPITAL ENCOUNTER (OUTPATIENT)
Dept: PHYSICAL THERAPY | Age: 79
Setting detail: THERAPIES SERIES
Discharge: HOME | End: 2019-11-21
Payer: MEDICARE

## 2019-11-21 PROCEDURE — 97110 THERAPEUTIC EXERCISES: CPT

## 2019-11-26 ENCOUNTER — HOSPITAL ENCOUNTER (OUTPATIENT)
Dept: PHYSICAL THERAPY | Age: 79
Setting detail: THERAPIES SERIES
Discharge: HOME | End: 2019-11-26
Payer: MEDICARE

## 2019-11-26 PROCEDURE — 97110 THERAPEUTIC EXERCISES: CPT

## 2019-11-26 PROCEDURE — 97112 NEUROMUSCULAR REEDUCATION: CPT

## 2019-12-03 ENCOUNTER — HOSPITAL ENCOUNTER (OUTPATIENT)
Dept: PHYSICAL THERAPY | Age: 79
Setting detail: THERAPIES SERIES
Discharge: HOME | End: 2019-12-03
Payer: MEDICARE

## 2019-12-03 PROCEDURE — 97110 THERAPEUTIC EXERCISES: CPT

## 2019-12-06 ENCOUNTER — HOSPITAL ENCOUNTER (OUTPATIENT)
Dept: PHYSICAL THERAPY | Age: 79
Setting detail: THERAPIES SERIES
Discharge: HOME | End: 2019-12-06
Payer: MEDICARE

## 2019-12-06 PROCEDURE — 97110 THERAPEUTIC EXERCISES: CPT

## 2019-12-10 ENCOUNTER — HOSPITAL ENCOUNTER (OUTPATIENT)
Dept: PHYSICAL THERAPY | Age: 79
Setting detail: THERAPIES SERIES
Discharge: HOME | End: 2019-12-10
Payer: MEDICARE

## 2019-12-10 PROCEDURE — 97110 THERAPEUTIC EXERCISES: CPT

## 2019-12-12 ENCOUNTER — HOSPITAL ENCOUNTER (OUTPATIENT)
Dept: PHYSICAL THERAPY | Age: 79
Setting detail: THERAPIES SERIES
Discharge: HOME | End: 2019-12-12
Payer: MEDICARE

## 2019-12-12 PROCEDURE — 97110 THERAPEUTIC EXERCISES: CPT

## 2019-12-17 ENCOUNTER — HOSPITAL ENCOUNTER (OUTPATIENT)
Dept: PHYSICAL THERAPY | Age: 79
Setting detail: THERAPIES SERIES
Discharge: HOME | End: 2019-12-17
Payer: MEDICARE

## 2019-12-17 PROCEDURE — 97112 NEUROMUSCULAR REEDUCATION: CPT

## 2019-12-17 PROCEDURE — 97110 THERAPEUTIC EXERCISES: CPT

## 2019-12-19 ENCOUNTER — HOSPITAL ENCOUNTER (OUTPATIENT)
Dept: PHYSICAL THERAPY | Age: 79
Setting detail: THERAPIES SERIES
Discharge: HOME | End: 2019-12-19
Payer: MEDICARE

## 2019-12-19 PROCEDURE — 97110 THERAPEUTIC EXERCISES: CPT

## 2019-12-19 PROCEDURE — 97112 NEUROMUSCULAR REEDUCATION: CPT

## 2020-09-08 ENCOUNTER — HOSPITAL ENCOUNTER (OUTPATIENT)
Dept: SLEEP CENTER | Age: 80
Discharge: HOME | End: 2020-09-08
Payer: MEDICARE

## 2020-09-08 VITALS
WEIGHT: 170 LBS | DIASTOLIC BLOOD PRESSURE: 65 MMHG | SYSTOLIC BLOOD PRESSURE: 124 MMHG | HEIGHT: 68 IN | OXYGEN SATURATION: 97 % | BODY MASS INDEX: 25.76 KG/M2 | HEART RATE: 58 BPM

## 2020-09-08 PROCEDURE — 99211 OFF/OP EST MAY X REQ PHY/QHP: CPT

## 2020-09-08 ASSESSMENT — SLEEP AND FATIGUE QUESTIONNAIRES
ESS TOTAL SCORE: 4
HOW LIKELY ARE YOU TO NOD OFF OR FALL ASLEEP WHILE SITTING AND TALKING TO SOMEONE: 0
HOW LIKELY ARE YOU TO NOD OFF OR FALL ASLEEP WHILE SITTING INACTIVE IN A PUBLIC PLACE: 0
HOW LIKELY ARE YOU TO NOD OFF OR FALL ASLEEP WHILE WATCHING TV: 0
HOW LIKELY ARE YOU TO NOD OFF OR FALL ASLEEP WHILE LYING DOWN TO REST IN THE AFTERNOON WHEN CIRCUMSTANCES PERMIT: 2
HOW LIKELY ARE YOU TO NOD OFF OR FALL ASLEEP IN A CAR, WHILE STOPPED FOR A FEW MINUTES IN TRAFFIC: 0
HOW LIKELY ARE YOU TO NOD OFF OR FALL ASLEEP WHILE SITTING QUIETLY AFTER LUNCH WITHOUT ALCOHOL: 0
HOW LIKELY ARE YOU TO NOD OFF OR FALL ASLEEP WHILE SITTING AND READING: 0
HOW LIKELY ARE YOU TO NOD OFF OR FALL ASLEEP WHEN YOU ARE A PASSENGER IN A CAR FOR AN HOUR WITHOUT A BREAK: 2

## 2020-09-08 NOTE — PROGRESS NOTES
Arnold Shaw MD, Gilma Muir MD, Theron Rutledge MD, Loretha Pallas MD, [facility]      [address], [address] [facility]   [place] [address]: [phone]  F: [phone]     Subjective:     Patient ID: Abel Landis is a 78 y.o. male, referred to the sleep center for   Chief Complaint   Patient presents with    Sleep Apnea    1 Year Follow Up   . Doing well  Referring physician:  ike jarrell    History:    Social History     Socioeconomic History    Marital status:      Spouse name: Not on file    Number of children: Not on file    Years of education: Not on file    Highest education level: Not on file   Occupational History    Not on file   Social Needs    Financial resource strain: Not on file    Food insecurity     Worry: Not on file     Inability: Not on file    Transportation needs     Medical: Not on file     Non-medical: Not on file   Tobacco Use    Smoking status: Never Smoker    Smokeless tobacco: Never Used   Substance and Sexual Activity    Alcohol use: No    Drug use: Not on file    Sexual activity: Not on file   Lifestyle    Physical activity     Days per week: Not on file     Minutes per session: Not on file    Stress: Not on file   Relationships    Social connections     Talks on phone: Not on file     Gets together: Not on file     Attends Zoroastrian service: Not on file     Active member of club or organization: Not on file     Attends meetings of clubs or organizations: Not on file     Relationship status: Not on file    Intimate partner violence     Fear of current or ex partner: Not on file     Emotionally abused: Not on file     Physically abused: Not on file     Forced sexual activity: Not on file   Other Topics Concern    Not on file   Social History Narrative    Not on file       Prior to Admission medications    Medication Sig Start Date End Date Taking?  Authorizing Provider   tamsulosin (FLOMAX) 0.4 MG capsule Take 0.4 mg by mouth daily   Yes Historical Provider, MD   amLODIPine-benazepril (LOTREL) 5-10 MG per capsule Take 1 capsule by mouth daily. Yes Historical Provider, MD   mometasone (NASONEX) 50 MCG/ACT nasal spray 2 sprays by Nasal route daily. Yes Historical Provider, MD   Ascorbic Acid (VITAMIN C) 500 MG tablet Take 1,000 mg by mouth daily. Yes Historical Provider, MD   Cholecalciferol (VITAMIN D3) 2000 UNITS CAPS Take 1 tablet by mouth. Yes Historical Provider, MD   ammonium lactate (AMLACTIN) 12 % cream Apply  topically as needed. Apply topically as needed. Yes Historical Provider, MD   Misc Natural Products (OSTEO BI-FLEX JOINT SHIELD PO) Take 2 tablets by mouth daily. Yes Historical Provider, MD   famotidine (PEPCID) 20 MG tablet Take 1 tablet by mouth 2 times daily for 7 days. 4/17/13 4/24/13  [address] [place] [address] Drive, MD   folic acid (FOLVITE) 1 MG tablet Take 1 mg by mouth daily. Historical Provider, MD   montelukast (SINGULAIR) 10 MG tablet Take 10 mg by mouth nightly. Historical Provider, MD   Calcipotriene-Betameth Diprop (TACLONEX EX) Apply  topically.  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    Hypertension     Pollen allergies     Psoriasis        Past Surgical History:   Procedure Laterality Date    EPIDIDYMECTOMY      EYE SURGERY      lesion biopsy of right eye    EYE SURGERY      cataract extraction right eye    HERNIA REPAIR  0176    Umbilical hernis       No family history on discontinued use  [x]  Current PAP user,  [years]   [x]  Patient Tolerates Well   []  Patient Does Not Tolerate     []  Patient Uses CPAP      [x]  More Than 4 Hours      []  Less Than 4 Hours  []  CPAP/BPAP/ASV Pressure Readings   []  CPAP Pressure      cm H20   []  BPAP Pressure       cm H20   []  ASV Pressure         cm H20      Assessment:      Diagnosis:  conrad       Patient Active Problem List    Diagnosis Date Noted    Abdominal aortic aneurysm without rupture ([facility] [address] 08/10/2016     Plan:        Sleep Study:     []  Sleep hygiene/ relaxation methods & CBTi principles review with patient     []  HST - Home Sleep Study   []  PSG - Overnight Diagnostic Polysomnogram     []  CPAP Titration    [] Split Night Study    [] BiLevel Titration    [] ASV - Auto-Servo Ventilation Titration       []  MSLT - Multiple Sleep Latency Test   []  MWT - Maintenance of Wakefulness Test    CPAP Therapy:     []  Patient to be seen for new mask fitting/desensitization   []  AutoPAP Titration    []  CPAP supplies and equipment at ________cmH2O    [x]  Continue same CPAP pressure   []  Change CPAP pressure to _______cm H2O   []  CPAP supplies only, no pressure change   []  Refer for an oral appliance       Medications:       [x]  Continue current medication    []  Add Medication:  ________________    Follow-Up:     []  No follow up required. Patient to return as needed. []  2 weeks   []  4 weeks   []  2 months   []  4 months   []  6 months   [x]  1 year for CPAP compliance evaluation. Patient to return sooner, as needed. []  Follow up after sleep study   []  Other: advised weight loss and sleep hygiene______________    No orders of the defined types were placed in this encounter.          Electronically signed by Sharri Franco MD on 9/8/2020 at 11:59 AM

## 2020-11-19 PROBLEM — I10 ESSENTIAL HYPERTENSION: Status: ACTIVE | Noted: 2020-11-19

## 2020-11-19 PROBLEM — E78.49 OTHER HYPERLIPIDEMIA: Status: ACTIVE | Noted: 2020-11-19

## 2021-02-17 ENCOUNTER — HOSPITAL ENCOUNTER (OUTPATIENT)
Age: 81
Discharge: HOME | End: 2021-02-17
Payer: MEDICARE

## 2021-02-17 LAB
ALBUMIN SERPL-MCNC: 4.1 GM/DL (ref 3.4–5)
ALP BLD-CCNC: 104 IU/L (ref 40–129)
ALT SERPL-CCNC: 15 U/L (ref 10–40)
ANION GAP SERPL CALCULATED.3IONS-SCNC: 12 MMOL/L (ref 4–16)
AST SERPL-CCNC: 27 IU/L (ref 15–37)
BASOPHILS ABSOLUTE: 0.1 K/CU MM
BASOPHILS RELATIVE PERCENT: 0.9 % (ref 0–1)
BILIRUB SERPL-MCNC: 0.7 MG/DL (ref 0–1)
BUN BLDV-MCNC: 33 MG/DL (ref 6–23)
CALCIUM SERPL-MCNC: 10 MG/DL (ref 8.3–10.6)
CHLORIDE BLD-SCNC: 103 MMOL/L (ref 99–110)
CHOLESTEROL, FASTING: 139 MG/DL
CO2: 24 MMOL/L (ref 21–32)
CREAT SERPL-MCNC: 1.2 MG/DL (ref 0.9–1.3)
DIFFERENTIAL TYPE: ABNORMAL
EOSINOPHILS ABSOLUTE: 0.3 K/CU MM
EOSINOPHILS RELATIVE PERCENT: 4 % (ref 0–3)
GFR AFRICAN AMERICAN: >60 ML/MIN/1.73M2
GFR NON-AFRICAN AMERICAN: 58 ML/MIN/1.73M2
GLUCOSE FASTING: 91 MG/DL (ref 70–99)
HCT VFR BLD CALC: 45.9 % (ref 42–52)
HDLC SERPL-MCNC: 39 MG/DL
HEMOGLOBIN: 14.5 GM/DL (ref 13.5–18)
IMMATURE NEUTROPHIL %: 0.4 % (ref 0–0.43)
LDL CHOLESTEROL DIRECT: 82 MG/DL
LYMPHOCYTES ABSOLUTE: 1 K/CU MM
LYMPHOCYTES RELATIVE PERCENT: 12.8 % (ref 24–44)
MCH RBC QN AUTO: 31 PG (ref 27–31)
MCHC RBC AUTO-ENTMCNC: 31.6 % (ref 32–36)
MCV RBC AUTO: 98.3 FL (ref 78–100)
MONOCYTES ABSOLUTE: 0.5 K/CU MM
MONOCYTES RELATIVE PERCENT: 6.8 % (ref 0–4)
PDW BLD-RTO: 13 % (ref 11.7–14.9)
PLATELET # BLD: 118 K/CU MM (ref 140–440)
PMV BLD AUTO: 11.3 FL (ref 7.5–11.1)
POTASSIUM SERPL-SCNC: 4.5 MMOL/L (ref 3.5–5.1)
PROSTATE SPECIFIC ANTIGEN: 0.73 NG/ML (ref 0–4)
RBC # BLD: 4.67 M/CU MM (ref 4.6–6.2)
SEGMENTED NEUTROPHILS ABSOLUTE COUNT: 6 K/CU MM
SEGMENTED NEUTROPHILS RELATIVE PERCENT: 75.1 % (ref 36–66)
SODIUM BLD-SCNC: 139 MMOL/L (ref 135–145)
TOTAL IMMATURE NEUTOROPHIL: 0.03 K/CU MM
TOTAL PROTEIN: 7.2 GM/DL (ref 6.4–8.2)
TRIGLYCERIDE, FASTING: 130 MG/DL
WBC # BLD: 8 K/CU MM (ref 4–10.5)

## 2021-02-17 PROCEDURE — 80053 COMPREHEN METABOLIC PANEL: CPT

## 2021-02-17 PROCEDURE — G0103 PSA SCREENING: HCPCS

## 2021-02-17 PROCEDURE — 80061 LIPID PANEL: CPT

## 2021-02-17 PROCEDURE — 85025 COMPLETE CBC W/AUTO DIFF WBC: CPT

## 2021-02-17 PROCEDURE — 36415 COLL VENOUS BLD VENIPUNCTURE: CPT

## 2021-08-23 ENCOUNTER — HOSPITAL ENCOUNTER (OUTPATIENT)
Age: 81
Discharge: HOME | End: 2021-08-23
Payer: MEDICARE

## 2021-08-23 LAB
ALT SERPL-CCNC: 14 U/L (ref 10–40)
AST SERPL-CCNC: 22 IU/L (ref 15–37)
BASOPHILS ABSOLUTE: 0.1 K/CU MM
BASOPHILS RELATIVE PERCENT: 1 % (ref 0–1)
CREAT SERPL-MCNC: 1 MG/DL (ref 0.9–1.3)
DIFFERENTIAL TYPE: ABNORMAL
EOSINOPHILS ABSOLUTE: 0.3 K/CU MM
EOSINOPHILS RELATIVE PERCENT: 5.6 % (ref 0–3)
GFR AFRICAN AMERICAN: >60 ML/MIN/1.73M2
GFR NON-AFRICAN AMERICAN: >60 ML/MIN/1.73M2
HCT VFR BLD CALC: 41.3 % (ref 42–52)
HEMOGLOBIN: 13.4 GM/DL (ref 13.5–18)
IMMATURE NEUTROPHIL %: 0.3 % (ref 0–0.43)
LYMPHOCYTES ABSOLUTE: 1.2 K/CU MM
LYMPHOCYTES RELATIVE PERCENT: 20.1 % (ref 24–44)
MCH RBC QN AUTO: 31.5 PG (ref 27–31)
MCHC RBC AUTO-ENTMCNC: 32.4 % (ref 32–36)
MCV RBC AUTO: 97.2 FL (ref 78–100)
MONOCYTES ABSOLUTE: 0.6 K/CU MM
MONOCYTES RELATIVE PERCENT: 10.1 % (ref 0–4)
PDW BLD-RTO: 13.2 % (ref 11.7–14.9)
PLATELET # BLD: 136 K/CU MM (ref 140–440)
PMV BLD AUTO: 10.6 FL (ref 7.5–11.1)
RBC # BLD: 4.25 M/CU MM (ref 4.6–6.2)
SEGMENTED NEUTROPHILS ABSOLUTE COUNT: 3.7 K/CU MM
SEGMENTED NEUTROPHILS RELATIVE PERCENT: 62.9 % (ref 36–66)
TOTAL IMMATURE NEUTOROPHIL: 0.02 K/CU MM
WBC # BLD: 5.9 K/CU MM (ref 4–10.5)

## 2021-08-23 PROCEDURE — 36415 COLL VENOUS BLD VENIPUNCTURE: CPT

## 2021-08-23 PROCEDURE — 85025 COMPLETE CBC W/AUTO DIFF WBC: CPT

## 2021-08-23 PROCEDURE — 84450 TRANSFERASE (AST) (SGOT): CPT

## 2021-08-23 PROCEDURE — 82565 ASSAY OF CREATININE: CPT

## 2021-08-23 PROCEDURE — 84460 ALANINE AMINO (ALT) (SGPT): CPT

## 2021-11-30 ENCOUNTER — HOSPITAL ENCOUNTER (OUTPATIENT)
Age: 81
Discharge: HOME | End: 2021-11-30
Payer: MEDICARE

## 2021-11-30 LAB
ALBUMIN SERPL-MCNC: 3.8 GM/DL (ref 3.4–5)
ALP BLD-CCNC: 103 IU/L (ref 40–129)
ALT SERPL-CCNC: 22 U/L (ref 10–40)
ANION GAP SERPL CALCULATED.3IONS-SCNC: 8 MMOL/L (ref 4–16)
AST SERPL-CCNC: 20 IU/L (ref 15–37)
BASOPHILS ABSOLUTE: 0.1 K/CU MM
BASOPHILS RELATIVE PERCENT: 1.6 % (ref 0–1)
BILIRUB SERPL-MCNC: 0.3 MG/DL (ref 0–1)
BUN BLDV-MCNC: 21 MG/DL (ref 6–23)
CALCIUM SERPL-MCNC: 8.9 MG/DL (ref 8.3–10.6)
CHLORIDE BLD-SCNC: 108 MMOL/L (ref 99–110)
CO2: 26 MMOL/L (ref 21–32)
CREAT SERPL-MCNC: 0.9 MG/DL (ref 0.9–1.3)
DIFFERENTIAL TYPE: ABNORMAL
EOSINOPHILS ABSOLUTE: 0.4 K/CU MM
EOSINOPHILS RELATIVE PERCENT: 8 % (ref 0–3)
GFR AFRICAN AMERICAN: >60 ML/MIN/1.73M2
GFR NON-AFRICAN AMERICAN: >60 ML/MIN/1.73M2
GLUCOSE FASTING: 82 MG/DL (ref 70–99)
HCT VFR BLD CALC: 42.9 % (ref 42–52)
HEMOGLOBIN: 13.4 GM/DL (ref 13.5–18)
IMMATURE NEUTROPHIL %: 0.4 % (ref 0–0.43)
LYMPHOCYTES ABSOLUTE: 1.4 K/CU MM
LYMPHOCYTES RELATIVE PERCENT: 28.7 % (ref 24–44)
MCH RBC QN AUTO: 30.9 PG (ref 27–31)
MCHC RBC AUTO-ENTMCNC: 31.2 % (ref 32–36)
MCV RBC AUTO: 98.8 FL (ref 78–100)
MONOCYTES ABSOLUTE: 0.5 K/CU MM
MONOCYTES RELATIVE PERCENT: 10.7 % (ref 0–4)
PDW BLD-RTO: 13.5 % (ref 11.7–14.9)
PLATELET # BLD: 95 K/CU MM (ref 140–440)
PMV BLD AUTO: 11.9 FL (ref 7.5–11.1)
POTASSIUM SERPL-SCNC: 3.9 MMOL/L (ref 3.5–5.1)
PROSTATE SPECIFIC ANTIGEN: 1.33 NG/ML (ref 0–4)
RBC # BLD: 4.34 M/CU MM (ref 4.6–6.2)
SEGMENTED NEUTROPHILS ABSOLUTE COUNT: 2.5 K/CU MM
SEGMENTED NEUTROPHILS RELATIVE PERCENT: 50.6 % (ref 36–66)
SODIUM BLD-SCNC: 142 MMOL/L (ref 135–145)
TOTAL IMMATURE NEUTOROPHIL: 0.02 K/CU MM
TOTAL PROTEIN: 5.9 GM/DL (ref 6.4–8.2)
TSH HIGH SENSITIVITY: 1.73 UIU/ML (ref 0.27–4.2)
WBC # BLD: 4.9 K/CU MM (ref 4–10.5)

## 2021-11-30 PROCEDURE — G0103 PSA SCREENING: HCPCS

## 2021-11-30 PROCEDURE — 85025 COMPLETE CBC W/AUTO DIFF WBC: CPT

## 2021-11-30 PROCEDURE — 80053 COMPREHEN METABOLIC PANEL: CPT

## 2021-11-30 PROCEDURE — 84443 ASSAY THYROID STIM HORMONE: CPT

## 2021-11-30 PROCEDURE — 36415 COLL VENOUS BLD VENIPUNCTURE: CPT

## 2022-01-31 ENCOUNTER — HOSPITAL ENCOUNTER (OUTPATIENT)
Age: 82
Discharge: HOME | End: 2022-01-31
Payer: MEDICARE

## 2022-01-31 LAB
BASOPHILS ABSOLUTE: 0.1 K/CU MM
BASOPHILS RELATIVE PERCENT: 2.3 % (ref 0–1)
DIFFERENTIAL TYPE: ABNORMAL
EOSINOPHILS ABSOLUTE: 0.4 K/CU MM
EOSINOPHILS RELATIVE PERCENT: 9.1 % (ref 0–3)
HCT VFR BLD CALC: 39.6 % (ref 42–52)
HEMOGLOBIN: 12 GM/DL (ref 13.5–18)
IMMATURE NEUTROPHIL %: 0.2 % (ref 0–0.43)
LYMPHOCYTES ABSOLUTE: 1 K/CU MM
LYMPHOCYTES RELATIVE PERCENT: 21 % (ref 24–44)
MCH RBC QN AUTO: 31.3 PG (ref 27–31)
MCHC RBC AUTO-ENTMCNC: 30.3 % (ref 32–36)
MCV RBC AUTO: 103.4 FL (ref 78–100)
MONOCYTES ABSOLUTE: 0.6 K/CU MM
MONOCYTES RELATIVE PERCENT: 13.2 % (ref 0–4)
PDW BLD-RTO: 14.9 % (ref 11.7–14.9)
PLATELET # BLD: 120 K/CU MM (ref 140–440)
PMV BLD AUTO: 11.1 FL (ref 7.5–11.1)
RBC # BLD: 3.83 M/CU MM (ref 4.6–6.2)
SEGMENTED NEUTROPHILS ABSOLUTE COUNT: 2.6 K/CU MM
SEGMENTED NEUTROPHILS RELATIVE PERCENT: 54.2 % (ref 36–66)
TOTAL IMMATURE NEUTOROPHIL: 0.01 K/CU MM
WBC # BLD: 4.7 K/CU MM (ref 4–10.5)

## 2022-01-31 PROCEDURE — 36415 COLL VENOUS BLD VENIPUNCTURE: CPT

## 2022-01-31 PROCEDURE — 85025 COMPLETE CBC W/AUTO DIFF WBC: CPT

## 2022-02-09 ENCOUNTER — HOSPITAL ENCOUNTER (OUTPATIENT)
Age: 82
Discharge: HOME | End: 2022-02-09
Payer: MEDICARE

## 2022-02-09 LAB
BASOPHILS ABSOLUTE: 0 K/CU MM
BASOPHILS RELATIVE PERCENT: 0.3 % (ref 0–1)
DIFFERENTIAL TYPE: ABNORMAL
EOSINOPHILS ABSOLUTE: 0 K/CU MM
EOSINOPHILS RELATIVE PERCENT: 0 % (ref 0–3)
HCT VFR BLD CALC: 39.4 % (ref 42–52)
HEMOGLOBIN: 12.4 GM/DL (ref 13.5–18)
IMMATURE NEUTROPHIL %: 0.4 % (ref 0–0.43)
LYMPHOCYTES ABSOLUTE: 0.6 K/CU MM
LYMPHOCYTES RELATIVE PERCENT: 7.8 % (ref 24–44)
MCH RBC QN AUTO: 31.2 PG (ref 27–31)
MCHC RBC AUTO-ENTMCNC: 31.5 % (ref 32–36)
MCV RBC AUTO: 99.2 FL (ref 78–100)
MONOCYTES ABSOLUTE: 0.6 K/CU MM
MONOCYTES RELATIVE PERCENT: 7.4 % (ref 0–4)
PDW BLD-RTO: 14.2 % (ref 11.7–14.9)
PLATELET # BLD: 129 K/CU MM (ref 140–440)
PMV BLD AUTO: 11.9 FL (ref 7.5–11.1)
RBC # BLD: 3.97 M/CU MM (ref 4.6–6.2)
SEGMENTED NEUTROPHILS ABSOLUTE COUNT: 6.4 K/CU MM
SEGMENTED NEUTROPHILS RELATIVE PERCENT: 84.1 % (ref 36–66)
TOTAL IMMATURE NEUTOROPHIL: 0.03 K/CU MM
WBC # BLD: 7.6 K/CU MM (ref 4–10.5)

## 2022-02-09 PROCEDURE — 83540 ASSAY OF IRON: CPT

## 2022-02-09 PROCEDURE — 82728 ASSAY OF FERRITIN: CPT

## 2022-02-09 PROCEDURE — 85025 COMPLETE CBC W/AUTO DIFF WBC: CPT

## 2022-02-09 PROCEDURE — 83550 IRON BINDING TEST: CPT

## 2022-02-09 PROCEDURE — 36415 COLL VENOUS BLD VENIPUNCTURE: CPT

## 2022-02-10 LAB
FERRITIN: 268 NG/ML (ref 30–400)
IRON: 68 UG/DL (ref 59–158)
PCT TRANSFERRIN: 27 % (ref 10–44)
TOTAL IRON BINDING CAPACITY: 255 UG/DL (ref 250–450)
UNSATURATED IRON BINDING CAPACITY: 187 UG/DL (ref 110–370)

## 2022-02-24 ENCOUNTER — HOSPITAL ENCOUNTER (OUTPATIENT)
Dept: PHYSICAL THERAPY | Age: 82
Setting detail: THERAPIES SERIES
Discharge: HOME | End: 2022-02-24
Payer: MEDICARE

## 2022-02-24 ENCOUNTER — HOSPITAL ENCOUNTER (OUTPATIENT)
Age: 82
Discharge: HOME | End: 2022-02-24
Payer: MEDICARE

## 2022-02-24 LAB
ANION GAP SERPL CALCULATED.3IONS-SCNC: 8 MMOL/L (ref 4–16)
BUN BLDV-MCNC: 23 MG/DL (ref 6–23)
CALCIUM SERPL-MCNC: 9.3 MG/DL (ref 8.3–10.6)
CHLORIDE BLD-SCNC: 107 MMOL/L (ref 99–110)
CO2: 25 MMOL/L (ref 21–32)
CREAT SERPL-MCNC: 0.9 MG/DL (ref 0.9–1.3)
GFR AFRICAN AMERICAN: >60 ML/MIN/1.73M2
GFR NON-AFRICAN AMERICAN: >60 ML/MIN/1.73M2
GLUCOSE FASTING: 81 MG/DL (ref 70–99)
POTASSIUM SERPL-SCNC: 4.3 MMOL/L (ref 3.5–5.1)
PROSTATE SPECIFIC ANTIGEN: 1.06 NG/ML (ref 0–4)
SODIUM BLD-SCNC: 140 MMOL/L (ref 135–145)

## 2022-02-24 PROCEDURE — 97110 THERAPEUTIC EXERCISES: CPT

## 2022-02-24 PROCEDURE — 80048 BASIC METABOLIC PNL TOTAL CA: CPT

## 2022-02-24 PROCEDURE — G0103 PSA SCREENING: HCPCS

## 2022-02-24 PROCEDURE — 97162 PT EVAL MOD COMPLEX 30 MIN: CPT

## 2022-02-24 PROCEDURE — 36415 COLL VENOUS BLD VENIPUNCTURE: CPT

## 2022-02-24 NOTE — FLOWSHEET NOTE
Outpatient Physical Therapy  [place]           [] Phone: [phone]   Fax: [phone]  [facility]           [x] Phone: [phone]   Fax: [phone]        Physical Therapy Daily Treatment Note  Date:  2022    Patient Name:  Mary Mello    :  1940  MRN: [id number]  Restrictions/Precautions:  fall risk   Diagnosis: Degenerative Lumbar Scoliosis (M41.86)     Treatment Diagnosis:    M62.81 muscle weakness, R26.89 abnormality of gait  Insurance/Certification information:   Medicare  Referring Physician:    Lazaro Blanca MD  Plan of care signed (Y/N): meme faxed. Outcome Measure: Oswestry: 48% disability  Visit# / total visits:   1/  Pain level: 10   Goals:       Short term goals to be achieved by : deferred to long term goals      Long term goals to be achieved by :  2022  Long term goal 1: Pt will demonstrate I with current HEP as prescribed in order to increase strength and ROM. Long term goal 2: Pt will demonstrate B hip flexion strength to 4/5 in order to improve strength. Long term goal 3: Pt will demonstrate lumbar flexion to 10 inches in order to improve ROM. Long term goal 4: Pt will report Oswestry score no more than 20/50 in order to improve quality of life. Subjective:  See eval     Any changes in Ambulatory Summary Sheet?   None    Objective:  See meme   COVID screening questions were asked and patient attested that there had been no contact or symptoms    Exercises: (No more than 4 columns)   Exercise/Equipment Date: 22 Date Date           WARM UP      Nustep                TABLE      Seated marches x15     TA contractions 10x5\"     Glut set  x10 5\"                    STANDING      HS curls  x15     HS/slant board stretch      3 way hip       Heel raises       Step taps                        PROPRIOCEPTION                                    MODALITIES                      Other Therapeutic Activities/Education:  Pt educated on findings, prognosis, POC and HEP. Pt verbalized understanding, and all questions answered at this date. Home Exercise Program:  2/24/22  Seated marching x15, TA contractions 15x5\", glut sets 15x5\", HS curls x15    Manual Treatments:  None. Modalities:  None. Communication with other providers:  eval faxed. Assessment:  (Response towards treatment session) (Pain Rating) Pt reports 3-4/10 pain at the end of session. Pt demonstrates good tolerance to exercises.         Plan for Next Session:  Continue per POC,  Attempt Estim if no pacemaker    Time In / Time Out:  920-1000      Timed Code/Total Treatment Minutes:  40; 1 mod eval, 2 te    Next Progress Note due: April 8, 2022       Plan of Care Interventions:  [x] Therapeutic Exercise  [x] Modalities:  [x] Therapeutic Activity     [] Ultrasound  [x] Estim  [] Gait Training      [] Cervical Traction [] Lumbar Traction  [] Neuromuscular Re-education    [] Cold/hotpack [] Iontophoresis   [x] Instruction in HEP      [] Vasopneumatic   [] Dry Needling    [x] Manual Therapy               [] Aquatic Therapy              Electronically signed by:  Quan Mitchell, PT,DPT [id number]  2/24/2022, 2:04 PM

## 2022-02-24 NOTE — PROGRESS NOTES
able to continue to do ADLs and cleaning    Patient reports hardest things at home: 1) getting out of chair 2) picking thing up off floor    Patient goals: hoping back will get better    Orientation: within functional limits     Objective    AROM  LE  Within functional limits in hip, knee and ankle. Lumbar fingertip to floor  Flexion: 12.5\" with increased pain  Extension: decreased by ~75% more pain than in flexion   Right side bend: 22 inches  Left side bend: 18.5 inches  Strength LE  Hip:   Right Left   Hip flexion         3+/5        3+/5     Knee:    Right Left   Knee flexion        4-/5        4-/5   Knee extension        4/5        4/5     Ankle:   Right Left   Ankle DF         4/5        5/5       Transfers  Sit to Stand: increased time required to complete with use of arm rests. Stand to sit: use of arm rests    Ambulation  Ambulation?: Yes  WB Status: FWB   Surface: level tile  Device: none  Assistance: supervision   Quality of Gait: pt demonstrates lateral more than forward movement throughout. Decreased trunk and pelvic movement. Distance: 148 feet   Stairs?: not performed. Additional Tests:   2-minute walk test: 148 feet. SLR: demonstrates about 50 degrees Bilaterally with pulling in low back   Oswestry: 24/50 = 48% disability     Assessment   Decreased functional mobility ; Decreased strength;Decreased endurance;Decreased high-level IADLs;Decreased ADL status; Decreased ROM; Assessment: Pt is a pleasant 77 yo male who would benefit from skilled PT to address decreased ROM, strength, balance, endurance, functional mobility, and increased pain. Prognosis: Good  Discharge Recommendations: Patient would benefit from additional therapy;Continue to assess pending progress,   Requires PT Follow Up: Yes  Activity Tolerance: Patient Tolerated treatment well. Treatment Administered: See flowsheet  Patient Education: See flowsheet  Learning Style: Any.      Plan   Plan of care initiated  Frequency and duration of tx:  2x/week for 6+ weeks   Barriers include: none  Treatment:  1. Therapeutic exercises including ROM, PREs, stretching, strengthening, and stability  2. Therapeutic activity  3. Gait training  4. Stair training  5. Neuro re-ed  6. Coordination training and body awareness  7. Positioning and postural awareness  8. Modalities including e-stim, ultrasound, heat, cold, and foam roll  9. Manual therapy including: STM, MFR, and TPR  10. Aquatic Therapy  11. Therapeutic taping  12. HEP and education    Goals  Short term goals to be achieved by : deferred to long term goals     Long term goals to be achieved by :April 8, 2022  Long term goal 1: Pt will demonstrate I with current HEP as prescribed in order to increase strength and ROM. Long term goal 2: Pt will demonstrate B hip flexion strength to 4/5 in order to improve strength. Long term goal 3: Pt will demonstrate lumbar flexion to 10 inches in order to improve ROM. Long term goal 4: Pt will report Oswestry score no more than 20/50 in order to improve quality of life. Note: Goals, frequency, plan, and recommendations will be updated as needed. Goals and treatment plan discussed with family and mutually agreed upon. YES   Will discharge patient when therapy goals have been met or when therapy is no longer deemed necessary. This plan was reviewed with the patient/family and they were in agreement. Electronically signed by:  Jae Cerrato PT,DPT [id number] Date: 2/24/2022, Time: 6:29 AM      I certify that the above patient is under my care and requires the above skilled services. These professional services are to be provided from an established plan, reviewed by me at least every 90 days. These services are related to the diagnosis stated above and are medically necessary.     Date last seen by physician:_________________________________________________    Physician Signature:____________________________________________Date:_______________

## 2022-03-01 ENCOUNTER — HOSPITAL ENCOUNTER (OUTPATIENT)
Dept: PHYSICAL THERAPY | Age: 82
Setting detail: THERAPIES SERIES
Discharge: HOME | End: 2022-03-01
Payer: MEDICARE

## 2022-03-01 PROCEDURE — 97110 THERAPEUTIC EXERCISES: CPT

## 2022-03-01 NOTE — FLOWSHEET NOTE
Outpatient Physical Therapy  [place]           [] Phone: [phone]   Fax: [phone]  [facility]           [x] Phone: [phone]   Fax: [phone]        Physical Therapy Daily Treatment Note  Date:  3/1/2022    Patient Name:  Chaim Grover    :  1940  MRN: [id number]  Restrictions/Precautions:  fall risk   Diagnosis: Degenerative Lumbar Scoliosis (M41.86)     Treatment Diagnosis:    M62.81 muscle weakness, R26.89 abnormality of gait  Insurance/Certification information:   Medicare  Referring Physician:    Jose G Lopez MD  Plan of care signed (Y/N): eval faxed. Outcome Measure: Oswestry: 48% disability  Visit# / total visits:   2/  Patient arrived 10 minutes late  Pain level: 4/10 at rest, 8/10 if moves wrong  Goals:       Short term goals to be achieved by : deferred to long term goals      Long term goals to be achieved by :  2022  Long term goal 1: Pt will demonstrate I with current HEP as prescribed in order to increase strength and ROM. Long term goal 2: Pt will demonstrate B hip flexion strength to 4/5 in order to improve strength. Long term goal 3: Pt will demonstrate lumbar flexion to 10 inches in order to improve ROM. Long term goal 4: Pt will report Oswestry score no more than 20/50 in order to improve quality of life. Subjective: Patient states that he just had a hot shower and put some cream on his back which helps with the pain. Has the most pain if he bend over or turns wrong. Any changes in Ambulatory Summary Sheet? None    Objective:  Verbal cues provided for proper form with exercises.      COVID screening questions were asked and patient attested that there had been no contact or symptoms    Exercises: (No more than 4 columns)   Exercise/Equipment Date: 22 Date  3/1/2022 Date           WARM UP      Nustep      Seat/Arms 11 Lev 5 x 10 minutes          TABLE      Seated marches x15 20x B    TA contractions 10x5\" 10x5\"    Glut set  x10 5\" 10x5\"                   STANDING      HS curls  x15 15x B    HS/slant board stretch      3 way hip   2x10 B    Heel raises   2x10    Step taps                        PROPRIOCEPTION                                    MODALITIES                      Other Therapeutic Activities/Education:  Pt educated on findings, prognosis, POC and HEP. Pt verbalized understanding, and all questions answered at this date. Home Exercise Program:  2/24/22  Seated marching x15, TA contractions 15x5\", glut sets 15x5\", HS curls x15    Manual Treatments:  None. Modalities:  None. Communication with other providers:  eval faxed. Assessment:  (Response towards treatment session) (Pain Rating) Patient rated his pain 3-4/10 after treatment. Did well with exercise increases. Will attempt e-stim at next visit if no contraindications.          Plan for Next Session:  Continue per POC,  Attempt Estim if no pacemaker    Time In / Time Out:  1615/1650    Timed Code/Total Treatment Minutes:  2 TE    Next Progress Note due: April 8, 2022       Plan of Care Interventions:  [x] Therapeutic Exercise  [x] Modalities:  [x] Therapeutic Activity     [] Ultrasound  [x] Estim  [] Gait Training      [] Cervical Traction [] Lumbar Traction  [] Neuromuscular Re-education    [] Cold/hotpack [] Iontophoresis   [x] Instruction in HEP      [] Vasopneumatic   [] Dry Needling    [x] Manual Therapy               [] Aquatic Therapy              Electronically signed by:  Elif Ruelas PTA   3/1/2022, 4:13 PM

## 2022-03-03 ENCOUNTER — HOSPITAL ENCOUNTER (OUTPATIENT)
Dept: PHYSICAL THERAPY | Age: 82
Setting detail: THERAPIES SERIES
Discharge: HOME | End: 2022-03-03
Payer: MEDICARE

## 2022-03-03 PROCEDURE — G0283 ELEC STIM OTHER THAN WOUND: HCPCS

## 2022-03-03 PROCEDURE — 97110 THERAPEUTIC EXERCISES: CPT

## 2022-03-03 NOTE — FLOWSHEET NOTE
Outpatient Physical Therapy  [place]           [] Phone: [phone]   Fax: [phone]  [facility]           [x] Phone: [phone]   Fax: [phone]        Physical Therapy Daily Treatment Note  Date:  3/3/2022    Patient Name:  Red Otto    :  1940  MRN: [id number]  Restrictions/Precautions:  fall risk   Diagnosis: Degenerative Lumbar Scoliosis (M41.86)     Treatment Diagnosis:    M62.81 muscle weakness, R26.89 abnormality of gait  Insurance/Certification information:   Medicare  Referring Physician:    Kobe Early MD  Plan of care signed (Y/N): eval faxed. Outcome Measure: Oswestry: 48% disability  Visit# / total visits:   3/    Pain level: 3/10  Goals:       Short term goals to be achieved by : deferred to long term goals      Long term goals to be achieved by :  2022  Long term goal 1: Pt will demonstrate I with current HEP as prescribed in order to increase strength and ROM. Long term goal 2: Pt will demonstrate B hip flexion strength to 4/5 in order to improve strength. Long term goal 3: Pt will demonstrate lumbar flexion to 10 inches in order to improve ROM. Long term goal 4: Pt will report Oswestry score no more than 20/50 in order to improve quality of life. Subjective:Patient rates his pain 3/10 today. Had an appointment with the chiropractor yesterday which seems to help. Any changes in Ambulatory Summary Sheet? None    Objective:  Verbal cues provided for proper form with exercises.      COVID screening questions were asked and patient attested that there had been no contact or symptoms    Exercises: (No more than 4 columns)   Exercise/Equipment Date: 22 Date  3/1/2022 Date  3/3/2022           WARM UP      Nustep      Seat/Arms 11 Lev 5 x 10 minutes Seat/Arms 11 Lev 5 x 10 minutes  465 steps         TABLE      Seated marches x15 20x B 20x B   TA contractions 10x5\" 10x5\" 10x5\"   Glut set  x10 5\" 10x5\" 10x5\"                  STANDING      HS curls  x15 15x B 15x B   HS/slant board stretch   1 min    3 way hip   2x10 B 2x10 B   Heel raises   2x10 2x10   Step taps   6\" x 1 min  6\" x1 min                     PROPRIOCEPTION                                    MODALITIES                      Other Therapeutic Activities/Education:  Pt educated on findings, prognosis, POC and HEP. Pt verbalized understanding, and all questions answered at this date. Home Exercise Program:  2/24/22  Seated marching x15, TA contractions 15x5\", glut sets 15x5\", HS curls x15    Manual Treatments:  None. Modalities:  None. Communication with other providers:  eval faxed. Assessment:  (Response towards treatment session) (Pain Rating) Rated his pain 2-3/10 after treatment.   Tolerated estim well       Plan for Next Session:  Continue per POC,  Attempt Estim if no pacemaker    Time In / Time Out:  1515/1630    Timed Code/Total Treatment Minutes:  13' ES, 30' TE    Next Progress Note due: April 8, 2022       Plan of Care Interventions:  [x] Therapeutic Exercise  [x] Modalities:  [x] Therapeutic Activity     [] Ultrasound  [x] Estim  [] Gait Training      [] Cervical Traction [] Lumbar Traction  [] Neuromuscular Re-education    [] Cold/hotpack [] Iontophoresis   [x] Instruction in HEP      [] Vasopneumatic   [] Dry Needling    [x] Manual Therapy               [] Aquatic Therapy              Electronically signed by:  Aarti Salgado PTA   3/3/2022, 3:16 PM

## 2022-03-11 ENCOUNTER — HOSPITAL ENCOUNTER (OUTPATIENT)
Dept: PHYSICAL THERAPY | Age: 82
Setting detail: THERAPIES SERIES
Discharge: HOME | End: 2022-03-11
Payer: MEDICARE

## 2022-03-11 PROCEDURE — G0283 ELEC STIM OTHER THAN WOUND: HCPCS

## 2022-03-11 PROCEDURE — 97530 THERAPEUTIC ACTIVITIES: CPT

## 2022-03-11 PROCEDURE — 97110 THERAPEUTIC EXERCISES: CPT

## 2022-03-11 NOTE — FLOWSHEET NOTE
Outpatient Physical Therapy  [place]           [] Phone: [phone]   Fax: [phone]  [facility]           [x] Phone: [phone]   Fax: [phone]        Physical Therapy Daily Treatment Note  Date:  3/11/2022    Patient Name:  David Landon    :  1940  MRN: [id number]  Restrictions/Precautions:  fall risk   Diagnosis: Degenerative Lumbar Scoliosis (M41.86)     Treatment Diagnosis:    M62.81 muscle weakness, R26.89 abnormality of gait  Insurance/Certification information:   Medicare  Referring Physician:    Bon Dahl MD  Plan of care signed (Y/N): meme faxed. Outcome Measure: Oswestry: 48% disability  Visit# / total visits:  4/    Pain level: 3/10  Goals:       Short term goals to be achieved by : deferred to long term goals      Long term goals to be achieved by :  2022  Long term goal 1: Pt will demonstrate I with current HEP as prescribed in order to increase strength and ROM. Long term goal 2: Pt will demonstrate B hip flexion strength to 4/5 in order to improve strength. Long term goal 3: Pt will demonstrate lumbar flexion to 10 inches in order to improve ROM. Long term goal 4: Pt will report Oswestry score no more than 20/50 in order to improve quality of life. Subjective:Patient reports of 3/10 pain upon arrival and states: \"I did just put some medicine on it though as well. \"   Any changes in Ambulatory Summary Sheet? None    Objective:  Verbal cues provided for proper form with exercises.      COVID screening questions were asked and patient attested that there had been no contact or symptoms    Exercises: (No more than 4 columns)   Exercise/Equipment Date: 22 Date  3/1/2022 Date  3/3/2022 3.11.2022            WARM UP       Nustep      Seat/Arms 11 Lev 5 x 10 minutes Seat/Arms 11 Lev 5 x 10 minutes  465 steps S11/A11 Lv5 11'    475 steps          TABLE       Seated marches x15 20x B 20x B 20x B   TA contractions 10x5\" 10x5\" 10x5\" 10x5\"   Glut set x10 5\" 10x5\" 10x5\" 10x10\"   Bridges    10x                                         STANDING       HS curls  x15 15x B 15x B 15x B   HS/slant board stretch   1 min  2x1'   3 way hip   2x10 B 2x10 B 2x10 ea way B   Heel raises   2x10 2x10 2x10   Step taps   6\" x 1 min  6\" x1 min  At steps 2'                      PROPRIOCEPTION                                          MODALITIES                         Other Therapeutic Activities/Education:  Pt educated on findings, prognosis, POC and HEP. Pt verbalized understanding, and all questions answered at this date. Home Exercise Program:  2/24/22  Seated marching x15, TA contractions 15x5\", glut sets 15x5\", HS curls x15    Manual Treatments:  None. Modalities:  IFC 80-120hz 15' to low back area      Communication with other providers:  eval faxed.      Assessment:  (Response towards treatment session) (Pain Rating) Rated his pain 3/10 after treatment and described it as discomfort     Plan for Next Session:  Continue per POC,  Attempt Estim if no pacemaker    Time In / Time Out:   0486-2760    Timed Code/Total Treatment Minutes:   67  15ifc  15ta    42te    Next Progress Note due: April 8, 2022       Plan of Care Interventions:  [x] Therapeutic Exercise  [x] Modalities:  [x] Therapeutic Activity     [] Ultrasound  [x] Estim  [] Gait Training      [] Cervical Traction [] Lumbar Traction  [] Neuromuscular Re-education    [] Cold/hotpack [] Iontophoresis   [x] Instruction in HEP      [] Vasopneumatic   [] Dry Needling    [x] Manual Therapy               [] Aquatic Therapy              Electronically signed by:  Fly Lockhart PTA   3/11/2022, 1:52 PM

## 2022-03-15 ENCOUNTER — HOSPITAL ENCOUNTER (OUTPATIENT)
Dept: PHYSICAL THERAPY | Age: 82
Setting detail: THERAPIES SERIES
Discharge: HOME | End: 2022-03-15
Payer: MEDICARE

## 2022-03-15 PROCEDURE — 97530 THERAPEUTIC ACTIVITIES: CPT

## 2022-03-15 PROCEDURE — G0283 ELEC STIM OTHER THAN WOUND: HCPCS

## 2022-03-15 PROCEDURE — 97110 THERAPEUTIC EXERCISES: CPT

## 2022-03-15 NOTE — FLOWSHEET NOTE
Outpatient Physical Therapy  [place]           [] Phone: [phone]   Fax: [phone]  [facility]           [x] Phone: [phone]   Fax: [phone]        Physical Therapy Daily Treatment Note  Date:  3/15/2022    Patient Name:  Ivana Fabry    :  1940  MRN: [id number]  Restrictions/Precautions:  fall risk   Diagnosis: Degenerative Lumbar Scoliosis (M41.86)     Treatment Diagnosis:    M62.81 muscle weakness, R26.89 abnormality of gait  Insurance/Certification information:   Medicare  Referring Physician:    Janeth Potts MD  Plan of care signed (Y/N): meme faxed. Outcome Measure: Oswestry: 48% disability  Visit# / total visits:  5/    Pain level: 3/10  Goals:       Short term goals to be achieved by : deferred to long term goals      Long term goals to be achieved by :  2022  Long term goal 1: Pt will demonstrate I with current HEP as prescribed in order to increase strength and ROM. Long term goal 2: Pt will demonstrate B hip flexion strength to 4/5 in order to improve strength. Long term goal 3: Pt will demonstrate lumbar flexion to 10 inches in order to improve ROM. Long term goal 4: Pt will report Oswestry score no more than 20/50 in order to improve quality of life. Subjective:Patient reports of 3/10 pain upon arrival and voices no new c/o. Any changes in Ambulatory Summary Sheet?   None    Objective:   Non-compliant with HEP    COVID screening questions were asked and patient attested that there had been no contact or symptoms    Exercises: (No more than 4 columns)   Exercise/Equipment Date  3/1/2022 Date  3/3/2022 3.11.2022 3/15/22            WARM UP       Nustep     Seat/Arms 11 Lev 5 x 10 minutes Seat/Arms 11 Lev 5 x 10 minutes  465 steps S11/A11 Lv5 11'    475 steps S11/A11 Lv5 10' 470 step          TABLE       Seated marches 20x B 20x B 20x B    TA contractions 10x5\" 10x5\" 10x5\" 10x5\"   Glut set  10x5\" 10x5\" 10x10\" 10x10\"   Bridges   10x 10 x STANDING       HS curls  15x B 15x B 15x B 15x B   HS/slant board stretch  1 min  2x1' 2x1'   3 way hip  2x10 B 2x10 B 2x10 ea way B 2x10 ea way B   Heel raises  2x10 2x10 2x10 3 x10   Step taps  6\" x 1 min  6\" x1 min  At steps 2' At steps 2'                      PROPRIOCEPTION                                          MODALITIES                         Other Therapeutic Activities/Education:  Pt educated on findings, prognosis, POC and HEP. Pt verbalized understanding, and all questions answered at this date. Home Exercise Program:  2/24/22  Seated marching x15, TA contractions 15x5\", glut sets 15x5\", HS curls x15    Manual Treatments:  None. Modalities:  IFC 80-120hz 15' to low back area      Communication with other providers:  eval faxed.      Assessment:  (Response towards treatment session) (Pain Rating)  Patients pain remains the same but does feel his muscles have loosened up at the end of the treatment     Plan for Next Session:  Continue per POC,  Attempt Estim if no pacemaker    Time In / Time Out:   6451-2754    Timed Code/Total Treatment Minutes:   62  15ifc 15ta 27te    Next Progress Note due: April 8, 2022       Plan of Care Interventions:  [x] Therapeutic Exercise  [x] Modalities:  [x] Therapeutic Activity     [] Ultrasound  [x] Estim  [] Gait Training      [] Cervical Traction [] Lumbar Traction  [] Neuromuscular Re-education    [] Cold/hotpack [] Iontophoresis   [x] Instruction in HEP      [] Vasopneumatic   [] Dry Needling    [x] Manual Therapy               [] Aquatic Therapy              Electronically signed by:  Rama Hoffman PTA  3/15/2022, 1:47 PM

## 2022-03-18 ENCOUNTER — HOSPITAL ENCOUNTER (OUTPATIENT)
Dept: PHYSICAL THERAPY | Age: 82
Setting detail: THERAPIES SERIES
Discharge: HOME | End: 2022-03-18
Payer: MEDICARE

## 2022-03-18 PROCEDURE — 97110 THERAPEUTIC EXERCISES: CPT

## 2022-03-18 PROCEDURE — 97530 THERAPEUTIC ACTIVITIES: CPT

## 2022-03-18 PROCEDURE — G0283 ELEC STIM OTHER THAN WOUND: HCPCS

## 2022-03-21 ENCOUNTER — HOSPITAL ENCOUNTER (OUTPATIENT)
Dept: PHYSICAL THERAPY | Age: 82
Setting detail: THERAPIES SERIES
Discharge: HOME | End: 2022-03-21
Payer: MEDICARE

## 2022-03-21 PROCEDURE — G0283 ELEC STIM OTHER THAN WOUND: HCPCS

## 2022-03-21 PROCEDURE — 97110 THERAPEUTIC EXERCISES: CPT

## 2022-03-21 PROCEDURE — 97530 THERAPEUTIC ACTIVITIES: CPT

## 2022-03-21 NOTE — FLOWSHEET NOTE
Outpatient Physical Therapy  [place]           [] Phone: [phone]   Fax: [phone]  [facility]           [x] Phone: [phone]   Fax: [phone]        Physical Therapy Daily Treatment Note  Date:  3/21/2022    Patient Name:  Veronica Gil    :  1940  MRN: [id number]  Restrictions/Precautions:  fall risk   Diagnosis: Degenerative Lumbar Scoliosis (M41.86)     Treatment Diagnosis:    M62.81 muscle weakness, R26.89 abnormality of gait  Insurance/Certification information:   Medicare  Referring Physician:    Joana Sagastume MD  Plan of care signed (Y/N): meme faxed. Outcome Measure: Oswestry: 48% disability  Visit# / total visits:    Pain level: 3/10  Goals:       Short term goals to be achieved by : deferred to long term goals      Long term goals to be achieved by :  2022  Long term goal 1: Pt will demonstrate I with current HEP as prescribed in order to increase strength and ROM. Long term goal 2: Pt will demonstrate B hip flexion strength to 4/5 in order to improve strength. Long term goal 3: Pt will demonstrate lumbar flexion to 10 inches in order to improve ROM. Long term goal 4: Pt will report Oswestry score no more than 20/50 in order to improve quality of life. Subjective:Patient reports of 3/10 pain upon arrival and reports that is with the topical cream the doctor prescribed . Any changes in Ambulatory Summary Sheet?   None    Objective:  No falls or near misses     COVID screening questions were asked and patient attested that there had been no contact or symptoms    Exercises: (No more than 4 columns)   Exercise/Equipment 3/15/22 3/18/2022 3/21/22           WARM UP      Nustep     S11/A11 Lv5 10' 470 step S11/A11 Lv5 10'   468 steps S11/A11 Lv5 10'  430 steps         TABLE      Seated march  --------    TA contractions 10x5\" 10x5\" 20x5\"   Glut set  10x10\" 10x10\" 20x3\"   Bridges 10 x 2x10 2x10                                    STANDING      HS curls  15x B 15x B 2x10 B   HS/slant board stretch 2x1' 2x1' 2x1'   3 way hip  2x10 ea way B 2x10 ea way B 2x10 ea way B   Heel raises  3 x10 3x10  3x10   Step taps  At steps 2' At steps 2' At steps 2'   High knee marches  3 laps 3 laps                                      PROPRIOCEPTION                                    MODALITIES                      Other Therapeutic Activities/Education:  Pt educated on findings, prognosis, POC and HEP. Pt verbalized understanding, and all questions answered at this date. Home Exercise Program:  2/24/22  Seated marching x15, TA contractions 15x5\", glut sets 15x5\", HS curls x15    Manual Treatments:  None. Modalities:  IFC 80-120hz 15' to low back area      Communication with other providers:  eval faxed.      Assessment:  (Response towards treatment session) (Pain Rating)  Patients reports of 2/10 pain at end of session       Plan for Next Session:  Continue per POC,  Attempt Estim if no pacemaker    Time In / Time Out:    1915-9568    Timed Code/Total Treatment Minutes:   61  15ifc 29te 15ta    Next Progress Note due: April 8, 2022       Plan of Care Interventions:  [x] Therapeutic Exercise  [x] Modalities:  [x] Therapeutic Activity     [] Ultrasound  [x] Estim  [] Gait Training      [] Cervical Traction [] Lumbar Traction  [] Neuromuscular Re-education    [] Cold/hotpack [] Iontophoresis   [x] Instruction in HEP      [] Vasopneumatic   [] Dry Needling    [x] Manual Therapy               [] Aquatic Therapy              Electronically signed by:  Rico Hidalgo PTA  3/21/2022, 2:33 PM

## 2022-03-23 ENCOUNTER — HOSPITAL ENCOUNTER (OUTPATIENT)
Dept: PHYSICAL THERAPY | Age: 82
Setting detail: THERAPIES SERIES
Discharge: HOME | End: 2022-03-23
Payer: MEDICARE

## 2022-03-23 PROCEDURE — 97110 THERAPEUTIC EXERCISES: CPT

## 2022-03-23 PROCEDURE — G0283 ELEC STIM OTHER THAN WOUND: HCPCS

## 2022-03-23 NOTE — FLOWSHEET NOTE
Outpatient Physical Therapy  [place]           [] Phone: [phone]   Fax: [phone]  Regino Desir           [x] Phone: [phone]   Fax: [phone]        Physical Therapy Daily Treatment Note  Date:  3/23/2022    Patient Name:  Vincent Stephenson    :  1940  MRN: [id number]  Restrictions/Precautions:  fall risk   Diagnosis: Degenerative Lumbar Scoliosis (M41.86)     Treatment Diagnosis:    M62.81 muscle weakness, R26.89 abnormality of gait  Insurance/Certification information:   Medicare  Referring Physician:    Ruma Aggarwal MD  Plan of care signed (Y/N): maryal faxed. Outcome Measure: Oswestry: 48% disability  Visit# / total visits:    Pain level: 3/10  Goals:       Short term goals to be achieved by : deferred to long term goals      Long term goals to be achieved by :  2022  Long term goal 1: Pt will demonstrate I with current HEP as prescribed in order to increase strength and ROM. Long term goal 2: Pt will demonstrate B hip flexion strength to 4/5 in order to improve strength. Long term goal 3: Pt will demonstrate lumbar flexion to 10 inches in order to improve ROM. Long term goal 4: Pt will report Oswestry score no more than 20/50 in order to improve quality of life. Subjective:Patient reports of 3/10 pain upon arrival and reports that is with the topical cream the doctor prescribed . Any changes in Ambulatory Summary Sheet?   None    Objective:  No falls or near misses     COVID screening questions were asked and patient attested that there had been no contact or symptoms    Exercises: (No more than 4 columns)   Exercise/Equipment 3/15/22 3/18/2022 3/21/22 3/23/22            WARM UP       Nustep     S11/A11 Lv5 10' 470 step S11/A11 Lv5 10'   468 steps S11/A11 Lv5 10'  430 steps S11/A11 Lv5 10'  426 steps          TABLE       Seated marches  --------     TA contractions 10x5\" 10x5\" 20x5\" 20x5\"   Glut set  10x10\" 10x10\" 20x3\" 20x3\"   Bridges 10 x 2x10 2x10 2x10                                         STANDING       HS curls  15x B 15x B 2x10 B 2x10 B   HS/slant board stretch 2x1' 2x1' 2x1' 2x1'   3 way hip  2x10 ea way B 2x10 ea way B 2x10 ea way B 2x10 ea way B   Heel raises  3 x10 3x10  3x10 3x10    Step taps  At steps 2' At steps 2' At steps 2' At steps 2'   High knee marches  3 laps 3 laps 3 laps                                           PROPRIOCEPTION                                          MODALITIES                         Other Therapeutic Activities/Education:  Pt educated on findings, prognosis, POC and HEP. Pt verbalized understanding, and all questions answered at this date. Home Exercise Program:  2/24/22  Seated marching x15, TA contractions 15x5\", glut sets 15x5\", HS curls x15    Manual Treatments:  None. Modalities:  IFC 80-120hz 15' to low back area      Communication with other providers:  eval faxed.      Assessment:  (Response towards treatment session) (Pain Rating)  Patients reports of 2/10 pain at end of session       Plan for Next Session:  Continue per POC,  Attempt Estim if no pacemaker    Time In / Time Out:     1576-1914    Timed Code/Total Treatment Minutes:  62  27te 15IFC   20min overlap not billed    Next Progress Note due: April 8, 2022       Plan of Care Interventions:  [x] Therapeutic Exercise  [x] Modalities:  [x] Therapeutic Activity     [] Ultrasound  [x] Estim  [] Gait Training      [] Cervical Traction [] Lumbar Traction  [] Neuromuscular Re-education    [] Cold/hotpack [] Iontophoresis   [x] Instruction in HEP      [] Vasopneumatic   [] Dry Needling    [x] Manual Therapy               [] Aquatic Therapy              Electronically signed by:  Simon White PTA  3/23/2022, 2:48 PM

## 2022-03-29 ENCOUNTER — HOSPITAL ENCOUNTER (OUTPATIENT)
Dept: PHYSICAL THERAPY | Age: 82
Setting detail: THERAPIES SERIES
Discharge: HOME | End: 2022-03-29
Payer: MEDICARE

## 2022-03-29 PROCEDURE — 97110 THERAPEUTIC EXERCISES: CPT

## 2022-03-29 NOTE — PROGRESS NOTES
[facility]  [address] [place] [address]  Phone: [phone]  Fax: [phone]      Physician: Dr Bon Dahl MD      From: Óscar Gibson, PT, DPT     Patient: David Landon                    : 1940  Diagnosis: Degenerative Lumbar Scoliosis (M41.86)   Date: 3/29/2022  Treatment Diagnosis:  M62.81 muscle weakness, R26.89 abnormality of gait    [x]  Progress Note                []  Discharge Note    Total Visits to date:   9 Cancels/No-shows to date:  0    Subjective: Pt reports he has a little more pain on this date secondary to not using his cream this am. He also reports he has not been performing his HEP. Plan of Care/Treatment to date:  [x] Therapeutic Exercise    [x] Modalities:  [x] Therapeutic Activity     [] Ultrasound  [x] Electric Stimulation  [x] Gait Training      [] Cervical Traction    [] Lumbar Traction  [x] Neuromuscular Re-education  [] Cold/hotpack [] Iontophoresis  [x] Instruction in HEP      Other:  [] Manual Therapy       []  Vasopneumatic  [] Aquatic Therapy       []                          Objective/Significant Findings At Last Visit/Comments:    Oswestry: 20/50 = 40% disability   FTTF: 8.75\"    Strength LE  Hip:    Right Left   Hip flexion         4/5        4/5      Assessment: Pt is a pleasant 79 yo male who would benefit from 4 more visits of PT to improve strength, functional mobility, and compliance with HEP. Goal Status:  [] Achieved [x] Partially Achieved  [] Not Achieved   Long term goal 1: Pt will demonstrate I with current HEP as prescribed in order to increase strength and ROM.  - not met, continue. Long term goal 2: Pt will demonstrate B hip flexion strength to 4/5 in order to improve strength.  - MET, discharge goal.   Long term goal 3: Pt will demonstrate lumbar flexion to 10 inches in order to improve ROM.    - MET, discharge goal.   Long term goal 4: Pt will report Oswestry score no more than 20/50 in order to improve quality of life. - MET, discharge goal.     Updated Goals to be achieved by April 12, 2022:  1) Continue Above  2) Pt will demonstrate R SB FTTF  no more than 2 inches different than L SB FTTF in order to improve ROM. 3) Pt will report worst pain in the last week no more than 5/10 in order to improve quality of life. Frequency/Duration:  # Days per week: [] 1 day # Weeks: [] 1 week [] 4 weeks      [x] 2 days   [x] 2 weeks [] 5 weeks      [] 3 days   [] 3 weeks [] 6 weeks     Rehab Potential: [x] Excellent [] Good [] Fair  [] Poor     Patient Status: [x] Continue per initial plan of Care     [] Patient now discharged     [x] Additional visits requested, Please re-certify for additional visits:      Requested frequency/duration: 2x/week for 2 weeks    If we are requesting more visits, we fully anticipate the patient's condition is expected to improve within the treatment timeframe we are requesting. Electronically signed by:  Lourdes More, PT, DPT, [id number]  3/29/2022, 1:26 PM    If you have any questions or concerns, please don't hesitate to call.   Thank you for your referral.    Physician Signature:______________________ Date:______ Time: ________  By signing above, therapists plan is approved by physician

## 2022-03-29 NOTE — FLOWSHEET NOTE
Outpatient Physical Therapy  [place]           [] Phone: [phone]   Fax: [phone]  Robert Burger           [x] Phone: [phone]   Fax: [phone]        Physical Therapy Daily Treatment Note  Date:  3/29/2022    Patient Name:  Red Otto    :  1940  MRN: [id number]  Restrictions/Precautions:  fall risk   Diagnosis: Degenerative Lumbar Scoliosis (M41.86)     Treatment Diagnosis:    M62.81 muscle weakness, R26.89 abnormality of gait  Insurance/Certification information:   Medicare  Referring Physician:    Kobe Early MD  Plan of care signed (Y/N): maryal faxed. Outcome Measure: Oswestry: 48% disability  Visit# / total visits:    Pain level: 4/10  Goals:       Short term goals to be achieved by : deferred to long term goals      Long term goals to be achieved by :  2022  Long term goal 1: Pt will demonstrate I with current HEP as prescribed in order to increase strength and ROM. - not met, continue. Long term goal 2: Pt will demonstrate B hip flexion strength to 4/5 in order to improve strength. Long term goal 3: Pt will demonstrate lumbar flexion to 10 inches in order to improve ROM. Long term goal 4: Pt will report Oswestry score no more than 20/50 in order to improve quality of life. - MET, discharge goal.     Subjective:Patient reports of 4/10 pain upon arrival and reports that it is a little higher because he didn't use his cream this am. He reports that he has not been compliant with HEP. Any changes in Ambulatory Summary Sheet?   None    Objective:  Oswestry: 20/50 = 40% disability   FTTF: 8.75\"    Strength LE  Hip:   Right Left   Hip flexion         4/5        4/5     COVID screening questions were asked and patient attested that there had been no contact or symptoms    Exercises: (No more than 4 columns)   Exercise/Equipment 3/15/22 3/18/2022 3/21/22 3/23/22 Date: 3/29/22             WARM UP        Nustep     S11/A11 Lv5 10' 470 step S11/A11 Lv5 10' 468 steps S11/A11 Lv5 10'  430 steps S11/A11 Lv5 10'  426 steps x10' S11/A11 Lvl 5 499           TABLE        Seated marches  --------      TA contractions 10x5\" 10x5\" 20x5\" 20x5\" 1x20 5\"   Glut set  10x10\" 10x10\" 20x3\" 20x3\" 1x20 5\"   Bridges 10 x 2x10 2x10 2x10 1x15                                               STANDING        HS curls  15x B 15x B 2x10 B 2x10 B 1x15  B LE   HS/slant board stretch 2x1' 2x1' 2x1' 2x1' 1' x2   3 way hip  2x10 ea way B 2x10 ea way B 2x10 ea way B 2x10 ea way B 1x15 B LE   Heel raises  3 x10 3x10  3x10 3x10  1x15   Step taps  At steps 2' At steps 2' At steps 2' At steps 2' 1x15 6\" step   High knee marches  3 laps 3 laps 3 laps 3 laps                                                PROPRIOCEPTION                                                MODALITIES                            Other Therapeutic Activities/Education:  Pt encouraged to perform HEP and reminded of it. Pt reports he will perform. Home Exercise Program:  2/24/22  Seated marching x15, TA contractions 15x5\", glut sets 15x5\", HS curls x15    Manual Treatments:  None. Modalities:      Communication with other providers:  eval faxed.      Assessment:  (Response towards treatment session) (Pain Rating)  Patients reports of 2-3/10 pain at end of session       Plan for Next Session:  Continue per POC,      Time In / Time Out:  10:20-11:15 am    Timed Code/Total Treatment Minutes:  55'/ 4 therapeutic exercise    Next Progress Note due: April 8, 2022       Plan of Care Interventions:  [x] Therapeutic Exercise  [x] Modalities:  [x] Therapeutic Activity     [] Ultrasound  [x] Estim  [] Gait Training      [] Cervical Traction [] Lumbar Traction  [] Neuromuscular Re-education    [] Cold/hotpack [] Iontophoresis   [x] Instruction in HEP      [] Vasopneumatic   [] Dry Needling    [x] Manual Therapy               [] Aquatic Therapy              Electronically signed by:  Jamil Marques, PT, DPT  [id number]  3/29/2022, 10:22 AM

## 2022-03-31 ENCOUNTER — HOSPITAL ENCOUNTER (OUTPATIENT)
Dept: PHYSICAL THERAPY | Age: 82
Setting detail: THERAPIES SERIES
Discharge: HOME | End: 2022-03-31
Payer: MEDICARE

## 2022-03-31 PROCEDURE — 97530 THERAPEUTIC ACTIVITIES: CPT

## 2022-03-31 PROCEDURE — 97110 THERAPEUTIC EXERCISES: CPT

## 2022-03-31 NOTE — FLOWSHEET NOTE
Outpatient Physical Therapy  [place]           [] Phone: [phone]   Fax: [phone]  Janice echols           [x] Phone: [phone]   Fax: [phone]        Physical Therapy Daily Treatment Note  Date:  3/31/2022    Patient Name:  Vicki Merida    :  1940  MRN: [id number]  Restrictions/Precautions:  fall risk   Diagnosis: Degenerative Lumbar Scoliosis (M41.86)     Treatment Diagnosis:    M62.81 muscle weakness, R26.89 abnormality of gait  Insurance/Certification information:   Medicare  Referring Physician:    Loy Arreola MD  Plan of care signed (Y/N): meme faxed. Outcome Measure: Oswestry: 48% disability  Visit# / total visits: 10/12     Pain level: 3/10  Goals:       Short term goals to be achieved by : deferred to long term goals      Long term goals to be achieved by :  2022  Long term goal 1: Pt will demonstrate I with current HEP as prescribed in order to increase strength and ROM. - not met, continue. Long term goal 2: Pt will demonstrate B hip flexion strength to 4/5 in order to improve strength. Long term goal 3: Pt will demonstrate lumbar flexion to 10 inches in order to improve ROM. Long term goal 4: Pt will report Oswestry score no more than 20/50 in order to improve quality of life. - MET, discharge goal.     Subjective:Patient reports of 3/10 pain upon arrival and voices no new c/o. Any changes in Ambulatory Summary Sheet?   None    Objective:   Non-compliant w/HEP    Strength LE  Hip:   Right Left   Hip flexion         4/5        4/5     COVID screening questions were asked and patient attested that there had been no contact or symptoms    Exercises: (No more than 4 columns)   Exercise/Equipment 3/23/22 Date: 3/29/22 3/31/22           WARM UP      Nustep     S11/A11 Lv5 10'  426 steps x10' S11/A11 Lvl 5 499 S11/A11 Lv5 10'  479 steps         TABLE      Seated marches      TA contractions 20x5\" 1x20 5\" 20x5\"   Glut set  20x3\" 1x20 5\" 20x5\"   Cat Taylor 2x10 1x15  15x                                    STANDING      HS curls  2x10 B 1x15  B LE 20x B   HS/slant board stretch 2x1' 1' x2 1' x2   3 way hip  2x10 ea way B 1x15 B LE 2x10 ea way B   Heel raises  3x10  1x15 2x10    Step taps  At steps 2' 1x15 6\" step 6\"  2'   High knee marches 3 laps 3 laps 3 laps                                      PROPRIOCEPTION                                    MODALITIES                      Other Therapeutic Activities/Education:  Pt encouraged to perform HEP and reminded of it. Pt reports he will perform. Home Exercise Program:  2/24/22  Seated marching x15, TA contractions 15x5\", glut sets 15x5\", HS curls x15    Manual Treatments:  None. Modalities:      Communication with other providers:  eval faxed.      Assessment:  (Response towards treatment session) (Pain Rating)  Patients reports of 2-3/10 pain at end of session       Plan for Next Session:  Continue per POC,      Time In / Time Out:   1509-9741    Timed Code/Total Treatment Minutes:  52 15ta 32te    Next Progress Note due: April 8, 2022       Plan of Care Interventions:  [x] Therapeutic Exercise  [x] Modalities:  [x] Therapeutic Activity     [] Ultrasound  [x] Estim  [] Gait Training      [] Cervical Traction [] Lumbar Traction  [] Neuromuscular Re-education    [] Cold/hotpack [] Iontophoresis   [x] Instruction in HEP      [] Vasopneumatic   [] Dry Needling    [x] Manual Therapy               [] Aquatic Therapy              Electronically signed by:  Zaria Fisher PTA  3/31/2022, 2:30 PM

## 2022-04-04 ENCOUNTER — HOSPITAL ENCOUNTER (OUTPATIENT)
Dept: PHYSICAL THERAPY | Age: 82
Setting detail: THERAPIES SERIES
Discharge: HOME | End: 2022-04-04
Payer: MEDICARE

## 2022-04-04 PROCEDURE — 97110 THERAPEUTIC EXERCISES: CPT

## 2022-04-04 PROCEDURE — 97530 THERAPEUTIC ACTIVITIES: CPT

## 2022-04-04 NOTE — FLOWSHEET NOTE
Outpatient Physical Therapy  [place]           [] Phone: [phone]   Fax: [phone]  Janice Gallardo           [x] Phone: [phone]   Fax: [phone]        Physical Therapy Daily Treatment Note  Date:  2022    Patient Name:  Vibha Monroy    :  1940  MRN: [id number]  Restrictions/Precautions:  fall risk   Diagnosis: Degenerative Lumbar Scoliosis (M41.86)     Treatment Diagnosis:    M62.81 muscle weakness, R26.89 abnormality of gait  Insurance/Certification information:   Medicare  Referring Physician:    Des Monte MD  Plan of care signed (Y/N): meme faxed. Outcome Measure: Oswestry: 48% disability  Visit# / total visits:      Pain level: 3/10  Goals:       Short term goals to be achieved by : deferred to long term goals      Long term goals to be achieved by :  2022  Long term goal 1: Pt will demonstrate I with current HEP as prescribed in order to increase strength and ROM. - not met, continue. Long term goal 2: Pt will demonstrate B hip flexion strength to 4/5 in order to improve strength. Long term goal 3: Pt will demonstrate lumbar flexion to 10 inches in order to improve ROM. Long term goal 4: Pt will report Oswestry score no more than 20/50 in order to improve quality of life. - MET, discharge goal.     Subjective:Patient reports of 3/10 pain upon arrival and voices no new c/o. Any changes in Ambulatory Summary Sheet?   None    Objective:   Non-compliant w/HEP    Strength LE  Hip:   Right Left   Hip flexion         4/5        4/5     COVID screening questions were asked and patient attested that there had been no contact or symptoms    Exercises: (No more than 4 columns)   Exercise/Equipment Date: 3/29/22 3/31/22 4/4/22           WARM UP      Nustep     x10' S11/A11 Lvl 5 499 S11/A11 Lv5 10'  479 steps S11/A11 Lv5 10' 485 steps         TABLE      Seated marches      TA contractions 1x20 5\" 20x5\" 20x5\"   Glut set  1x20 5\" 20x5\" 20x5\"   Bridges 1x15 15x 15x                                    STANDING      HS curls  1x15  B LE 20x B 20x B   HS/slant board stretch 1' x2 1' x2 2x1'   3 way hip  1x15 B LE 2x10 ea way B 2x10 ea way B   Heel raises  1x15 2x10  2x10   Step taps  1x15 6\" step 6\"  2' 6\" 2'   High knee marches 3 laps 3 laps 3 laps                                      PROPRIOCEPTION                                    MODALITIES                      Other Therapeutic Activities/Education:  Pt encouraged to perform HEP and reminded of it. Pt reports he will perform. Home Exercise Program:  2/24/22  Seated marching x15, TA contractions 15x5\", glut sets 15x5\", HS curls x15    Manual Treatments:  None. Modalities:      Communication with other providers:  eval faxed.      Assessment:  (Response towards treatment session) (Pain Rating)  Patients reports of 4/10 pain at end of session       Plan for Next Session:  Continue per POC,      Time In / Time Out:   8121-1348    Timed Code/Total Treatment Minutes:   40  15ta 25te    Next Progress Note due: April 8, 2022       Plan of Care Interventions:  [x] Therapeutic Exercise  [x] Modalities:  [x] Therapeutic Activity     [] Ultrasound  [x] Estim  [] Gait Training      [] Cervical Traction [] Lumbar Traction  [] Neuromuscular Re-education    [] Cold/hotpack [] Iontophoresis   [x] Instruction in HEP      [] Vasopneumatic   [] Dry Needling    [x] Manual Therapy               [] Aquatic Therapy              Electronically signed by:  Arlet Hurst PTA  4/4/2022, 2:34 PM

## 2022-04-07 ENCOUNTER — HOSPITAL ENCOUNTER (OUTPATIENT)
Dept: PHYSICAL THERAPY | Age: 82
Setting detail: THERAPIES SERIES
Discharge: HOME | End: 2022-04-07
Payer: MEDICARE

## 2022-04-07 PROCEDURE — 97530 THERAPEUTIC ACTIVITIES: CPT

## 2022-04-07 PROCEDURE — 97110 THERAPEUTIC EXERCISES: CPT

## 2022-04-07 NOTE — FLOWSHEET NOTE
Outpatient Physical Therapy  [place]           [] Phone: [phone]   Fax: [phone]  Janice park           [x] Phone: [phone]   Fax: [phone]        Physical Therapy Daily Treatment Note  Date:  2022    Patient Name:  Carlos Jansen    :  1940  MRN: [id number]  Restrictions/Precautions:  fall risk   Diagnosis: Degenerative Lumbar Scoliosis (M41.86)     Treatment Diagnosis:    M62.81 muscle weakness, R26.89 abnormality of gait  Insurance/Certification information:   Medicare  Referring Physician:    Elvia Mcnally MD  Plan of care signed (Y/N): meme faxed. Outcome Measure: Oswestry: 48% disability  Visit# / total visits:      Pain level: 3/10  Goals:       Short term goals to be achieved by : deferred to long term goals      Long term goals to be achieved by :  2022  Long term goal 1: Pt will demonstrate I with current HEP as prescribed in order to increase strength and ROM. - not met, continue. Long term goal 2: Pt will demonstrate B hip flexion strength to 4/5 in order to improve strength. Long term goal 3: Pt will demonstrate lumbar flexion to 10 inches in order to improve ROM. Long term goal 4: Pt will report Oswestry score no more than 20/50 in order to improve quality of life. - MET, discharge goal.     Subjective:Patient reports of 3/10 pain upon arrival in his back and reports his phys wants him to continue with therapy. Any changes in Ambulatory Summary Sheet?   None    Objective:   Non-compliant w/HEP    Strength LE  Hip:   Right Left   Hip flexion         4/5        4/5     COVID screening questions were asked and patient attested that there had been no contact or symptoms    Exercises: (No more than 4 columns)   Exercise/Equipment Date: 3/29/22 3/31/22 4/4/22 4/7/22            WARM UP       Nustep     x10' S11/A11 Lvl 5 499 S11/A11 Lv5 10'  479 steps S11/A11 Lv5 10' 485 steps S11/A11 Lv5 10'  452 steps          TABLE       Seated antolin TA contractions 1x20 5\" 20x5\" 20x5\" 20x 5\"   Glut set  1x20 5\" 20x5\" 20x5\" 20x 5\"   Bridges 1x15  15x 15x 2x10                                         STANDING       HS curls  1x15  B LE 20x B 20x B 20x B   HS/slant board stretch 1' x2 1' x2 2x1' 2x1'   3 way hip  1x15 B LE 2x10 ea way B 2x10 ea way B 2x10 ea way B   Heel raises  1x15 2x10  2x10 2x10   Step taps  1x15 6\" step 6\"  2' 6\" 2' 6\"  2'   High knee marches 3 laps 3 laps 3 laps 3 laps                                            PROPRIOCEPTION                                          MODALITIES                         Other Therapeutic Activities/Education:  Pt encouraged to perform HEP and reminded of it. Pt reports he will perform. Home Exercise Program:  2/24/22  Seated marching x15, TA contractions 15x5\", glut sets 15x5\", HS curls x15    Manual Treatments:  None. Modalities:      Communication with other providers:  eval faxed.      Assessment:  (Response towards treatment session) (Pain Rating)  Patients reports of 2/10 pain at end of session       Plan for Next Session:  Continue per POC,      Time In / Time Out:   1856-0962    Timed Code/Total Treatment Minutes:  44  15ta 29te     Next Progress Note due: April 8, 2022       Plan of Care Interventions:  [x] Therapeutic Exercise  [x] Modalities:  [x] Therapeutic Activity     [] Ultrasound  [x] Estim  [] Gait Training      [] Cervical Traction [] Lumbar Traction  [] Neuromuscular Re-education    [] Cold/hotpack [] Iontophoresis   [x] Instruction in HEP      [] Vasopneumatic   [] Dry Needling    [x] Manual Therapy               [] Aquatic Therapy              Electronically signed by:  Merlinda Barbone, PTA  4/7/2022, 1:05 PM

## 2022-04-18 ENCOUNTER — HOSPITAL ENCOUNTER (OUTPATIENT)
Dept: PHYSICAL THERAPY | Age: 82
Setting detail: THERAPIES SERIES
Discharge: HOME | End: 2022-04-18
Payer: MEDICARE

## 2022-04-18 PROCEDURE — 97110 THERAPEUTIC EXERCISES: CPT

## 2022-04-18 NOTE — FLOWSHEET NOTE
Outpatient Physical Therapy  [place]           [] Phone: [phone]   Fax: [phone]  [place] Hsu           [x] Phone: [phone]   Fax: [phone]        Physical Therapy Daily Treatment Note  Date:  2022    Patient Name:  Laura Keenan    :  1940  MRN: [id number]  Restrictions/Precautions:  fall risk   Diagnosis: Degenerative Lumbar Scoliosis (M41.86)     Treatment Diagnosis:    M62.81 muscle weakness, R26.89 abnormality of gait  Insurance/Certification information:   Medicare  Referring Physician:    Sridhar Grimm MD  Plan of care signed (Y/N): maryal faxed. Outcome Measure: Oswestry: 48% disability  Visit# / total visits:      Pain level: 3/10  Goals:       Short term goals to be achieved by : deferred to long term goals      Long term goals to be achieved by :  2022  Long term goal 1: Pt will demonstrate I with current HEP as prescribed in order to increase strength and ROM. - not met, continue. Long term goal 2: Pt will demonstrate B hip flexion strength to 4/5 in order to improve strength. - MET, discharge goal.   Long term goal 3: Pt will demonstrate lumbar flexion to 10 inches in order to improve ROM. Long term goal 4: Pt will report Oswestry score no more than 20/50 in order to improve quality of life. - MET, discharge goal.     Subjective:Patient reports of 3/10 pain upon arrival in his back and reports no changes at this time. Any changes in Ambulatory Summary Sheet?   None    Objective:   Non-compliant w/HEP  Lumbar flexion: FTTF:     Strength LE  Hip:   Right Left   Hip flexion         /5        4/     COVID screening questions were asked and patient attested that there had been no contact or symptoms    Exercises: (No more than 4 columns)   Exercise/Equipment 22 Date: 22           WARM UP      Nustep     S11/A11 Lv5 10' 485 steps S11/A11 Lv5 10'  452 steps x10' S11/A11 Lvl 5 4 500 steps         TABLE      Seated antolin      TA contractions 20x5\" 20x 5\" 1x20 5\"   Glut set  20x5\" 20x 5\"    Bridges 15x 2x10 1x20 5\"   TA contraction UE      TA contraction LE      DKTC                     STANDING      HS curls  20x B 20x B 1x20 B LE   HS/slant board stretch 2x1' 2x1' 2x1'   3 way hip  2x10 ea way B 2x10 ea way B 1x20 all B LE   Heel raises  2x10 2x10 1x20   Step taps  6\" 2' 6\"  2' 6\" x2'   High knee marches 3 laps 3 laps  3 laps                                      PROPRIOCEPTION                                    MODALITIES                      Other Therapeutic Activities/Education:  Pt encouraged to perform HEP and reminded of it. Pt reports he will perform. Home Exercise Program:  2/24/22  Seated marching x15, TA contractions 15x5\", glut sets 15x5\", HS curls x15    Manual Treatments:  None. Modalities:      Communication with other providers:  Updated POC to be faxed.      Assessment:  (Response towards treatment session) (Pain Rating)  Patients reports of 5/10 pain at end of session       Plan for Next Session:  Continue per POC,      Time In / Time Out:  1:05-2:00 pm    Timed Code/Total Treatment Minutes:  55'/ 4 therapeutic exercise    Next Progress Note due: April 8, 2022       Plan of Care Interventions:  [x] Therapeutic Exercise  [x] Modalities:  [x] Therapeutic Activity     [] Ultrasound  [x] Estim  [] Gait Training      [] Cervical Traction [] Lumbar Traction  [] Neuromuscular Re-education    [] Cold/hotpack [] Iontophoresis   [x] Instruction in HEP      [] Vasopneumatic   [] Dry Needling    [x] Manual Therapy               [] Aquatic Therapy              Electronically signed by:  Bhavin Grijalva PT, DPT [id number]   4/18/2022, 1:05 PM

## 2022-04-18 NOTE — PROGRESS NOTES
[facility] Outpatient Physical Therapy  [address] [place] [address]  Phone: [phone]  Fax: [phone]      Physician: Dr Shanda Muñoz MD      From: Luis Rogers, PT, DPT     Patient: Basilia Hector                    : 1940  Diagnosis: Degenerative Lumbar Scoliosis (M41.86)   Date: 2022  Treatment Diagnosis:  M62.81 muscle weakness, R26.89 abnormality of gait    [x]  Progress Note                []  Discharge Note    Total Visits to date:   9 Cancels/No-shows to date:  0    Subjective: Pt reports he continues to have pain about 3/10 and has not been compliance with HEP. Plan of Care/Treatment to date:  [x] Therapeutic Exercise    [x] Modalities:  [x] Therapeutic Activity     [] Ultrasound  [x] Electric Stimulation  [x] Gait Training      [] Cervical Traction    [] Lumbar Traction  [x] Neuromuscular Re-education  [] Cold/hotpack [] Iontophoresis  [x] Instruction in HEP      Other:  [] Manual Therapy       []  Vasopneumatic  [] Aquatic Therapy       []                          Objective/Significant Findings At Last Visit/Comments:    Oswestry: 20/50 = 40% disability   FTTF: 13.5\"    Strength LE  Hip:    Right Left   Hip flexion         /5              Assessment: Pt is a pleasant 77 yo male who would benefit from continuation of PT for 1 month to address decreased ROM, strength, balance, and functional mobility as well as increased pain. Goal Status:  [] Achieved [x] Partially Achieved  [] Not Achieved   1) Pt will demonstrate I with current HEP as prescribed in order to increase strength and ROM.  - not met, continue. 2) Pt will demonstrate R SB FTTF  no more than 2 inches different than L SB FTTF in order to improve ROM. 3) Pt will report worst pain in the last week no more than 5/10 in order to improve quality of life.      Updated Goals to be continued through May 18, 2022:  1) ) Pt will demonstrate I with current HEP as prescribed in order to increase strength and ROM. 2) Pt will demonstrate R SB FTTF  no more than 2 inches different than L SB FTTF in order to improve ROM. 3) Pt will report worst pain in the last week no more than 5/10 in order to improve quality of life. Frequency/Duration:  # Days per week: [] 1 day # Weeks: [] 1 week [x] 4 weeks      [x] 2 days   [] 2 weeks [] 5 weeks      [] 3 days   [] 3 weeks [] 6 weeks     Rehab Potential: [x] Excellent [] Good [] Fair  [] Poor     Patient Status: [x] Continue per initial plan of Care     [] Patient now discharged     [x] Additional visits requested, Please re-certify for additional visits:      Requested frequency/duration: 2x/week for 4 weeks    If we are requesting more visits, we fully anticipate the patient's condition is expected to improve within the treatment timeframe we are requesting. Electronically signed by:  Yenny Tesfaye PT, DPT, [id number]  4/18/2022, 4:02 PM    If you have any questions or concerns, please don't hesitate to call.   Thank you for your referral.    Physician Signature:______________________ Date:______ Time: ________  By signing above, therapists plan is approved by physician

## 2022-04-21 ENCOUNTER — HOSPITAL ENCOUNTER (OUTPATIENT)
Dept: PHYSICAL THERAPY | Age: 82
Setting detail: THERAPIES SERIES
Discharge: HOME | End: 2022-04-21
Payer: MEDICARE

## 2022-04-21 PROCEDURE — 97110 THERAPEUTIC EXERCISES: CPT

## 2022-04-21 NOTE — FLOWSHEET NOTE
Outpatient Physical Therapy  [place]           [] Phone: [phone]   Fax: [phone]  [place]           [x] Phone: [phone]   Fax: [phone]        Physical Therapy Daily Treatment Note  Date:  2022    Patient Name:  Mary Lou Osman    :  1940  MRN: [id number]  Restrictions/Precautions:  fall risk   Diagnosis: Degenerative Lumbar Scoliosis (M41.86)     Treatment Diagnosis:    M62.81 muscle weakness, R26.89 abnormality of gait  Insurance/Certification information:   Medicare  Referring Physician:    Antonia Stuart MD  Plan of care signed (Y/N): eval faxed. Outcome Measure: Oswestry: 48% disability  Visit# / total visits:      Pain level: 5/10  Goals:       Short term goals to be achieved by : deferred to long term goals      Long term goals to be achieved by :  2022  Long term goal 1: Pt will demonstrate I with current HEP as prescribed in order to increase strength and ROM. - not met, continue. Long term goal 2: Pt will demonstrate B hip flexion strength to 4/5 in order to improve strength. - MET, discharge goal.   Long term goal 3: Pt will demonstrate lumbar flexion to 10 inches in order to improve ROM. Long term goal 4: Pt will report Oswestry score no more than 20/50 in order to improve quality of life. - MET, discharge goal.     Subjective: Pt reports 5/10 pain upon arrival today. He reports his HEP is \"not going well. \"    Any changes in Ambulatory Summary Sheet?   None    Objective:  Unable to use R arm for TA Contraction UE          COVID screening questions were asked and patient attested that there had been no contact or symptoms    Exercises: (No more than 4 columns)   Exercise/Equipment 22 Date: 22            WARM UP       Nustep     S11/A11 Lv5 10' 485 steps S11/A11 Lv5 10'  452 steps x10' S11/A11 Lvl 5 500 steps x10' S11/A11 Lv5  535 steps          TABLE       Seated marches       TA contractions 20x5\" 20x 5\" 1x20 5\" 1x20 5\" Glute set  20x5\" 20x 5\"     Bridges 15x 2x10 1x20 5\" 1x20 5\"   TA contraction UE    10x L   TA contraction LE       DKTC                        STANDING       HS curls  20x B 20x B 1x20 B LE 20x B   HS/slant board stretch 2x1' 2x1' 2x1' 2x1'   3 way hip  2x10 ea way B 2x10 ea way B 1x20 all B LE 1x20 all B LE   Heel raises  2x10 2x10 1x20 1x20   Step taps  6\" 2' 6\"  2' 6\" x2' 6\" 2'   High knee marches 3 laps 3 laps  3 laps 3 laps                                           PROPRIOCEPTION                                          MODALITIES                         Other Therapeutic Activities/Education:  Pt encouraged to perform HEP and reminded of it. Pt reports he will perform. Home Exercise Program:  2/24/22  Seated marching x15, TA contractions 15x5\", glut sets 15x5\", HS curls x15    Manual Treatments:  None. Modalities:      Communication with other providers:  Updated POC to be faxed. Assessment:  (Response towards treatment session) (Pain Rating)  Initiated TA Contraction UE today, pt unable to use R arm d/t previous shoulder pain. Pt otherwise responds well to session. Pain rating at end of session 7/10. Plan for Next Session:  Continue per POC.     Time In / Time Out:  1304/1348     Timed Code/Total Treatment Minutes:  40' (3 TE)    Next Progress Note due: April 8, 2022       Plan of Care Interventions:  [x] Therapeutic Exercise  [x] Modalities:  [x] Therapeutic Activity     [] Ultrasound  [x] Estim  [] Gait Training      [] Cervical Traction [] Lumbar Traction  [] Neuromuscular Re-education    [] Cold/hotpack [] Iontophoresis   [x] Instruction in HEP      [] Vasopneumatic   [] Dry Needling    [x] Manual Therapy               [] Aquatic Therapy              Electronically signed by:  Magnus Leon PTA   4/21/2022, 1:01 PM

## 2022-04-25 ENCOUNTER — HOSPITAL ENCOUNTER (OUTPATIENT)
Dept: PHYSICAL THERAPY | Age: 82
Setting detail: THERAPIES SERIES
Discharge: HOME | End: 2022-04-25
Payer: MEDICARE

## 2022-04-25 PROCEDURE — 97110 THERAPEUTIC EXERCISES: CPT

## 2022-04-25 NOTE — FLOWSHEET NOTE
Outpatient Physical Therapy  [place]           [] Phone: [phone]   Fax: [phone]  [place] [place]           [x] Phone: [phone]   Fax: [phone]        Physical Therapy Daily Treatment Note  Date:  2022    Patient Name:  Kimberly Maynard    :  1940  MRN: [id number]  Restrictions/Precautions:  fall risk   Diagnosis: Degenerative Lumbar Scoliosis (M41.86)     Treatment Diagnosis:    M62.81 muscle weakness, R26.89 abnormality of gait  Insurance/Certification information:   Medicare  Referring Physician:    Nova Khan MD  Plan of care signed (Y/N): eval faxed. Outcome Measure: Oswestry: 48% disability  Visit# / total visits: 15/12     Pain level: 4/10  Goals:       Short term goals to be achieved by : deferred to long term goals      Long term goals to be achieved by :  2022  Long term goal 1: Pt will demonstrate I with current HEP as prescribed in order to increase strength and ROM. - not met, continue. Long term goal 2: Pt will demonstrate B hip flexion strength to 4/5 in order to improve strength. - MET, discharge goal.   Long term goal 3: Pt will demonstrate lumbar flexion to 10 inches in order to improve ROM. Long term goal 4: Pt will report Oswestry score no more than 20/50 in order to improve quality of life. - MET, discharge goal.     Subjective: Pt reports 4/10 pain in the LB today. He reports that he did not do much over the weekend. Any changes in Ambulatory Summary Sheet?   None    Objective:  Not compliant with HEP          COVID screening questions were asked and patient attested that there had been no contact or symptoms    Exercises: (No more than 4 columns)   Exercise/Equipment 22 Date: 22            WARM UP       Nustep     S11/A11 Lv5 10'  452 steps x10' S11/A11 Lvl 5 500 steps x10' S11/A11 Lv5  535 steps x10' S11/A11 Lv5   496 steps          TABLE       Seated marches       TA contractions 20x 5\" 1x20 5\" 1x20 5\" 1x25 5\" Glute set  20x 5\"      Bridges 2x10 1x20 5\" 1x20 5\" 20x5\"   TA contraction UE   10x L 15x L   TA contraction LE    10x B   DKTC    10x                    STANDING       HS curls  20x B 1x20 B LE 20x B 20x B   HS/slant board stretch 2x1' 2x1' 2x1' 2x1'   3 way hip  2x10 ea way B 1x20 all B LE 1x20 all B LE 1x20 all B LE   Heel raises  2x10 1x20 1x20 30x   Step taps  6\"  2' 6\" x2' 6\" 2' 6\" 2'   High knee marches 3 laps  3 laps 3 laps next                                           PROPRIOCEPTION                                          MODALITIES                         Other Therapeutic Activities/Education:  Pt encouraged to perform HEP and reminded of it. Pt reports he will perform. Home Exercise Program:  2/24/22  Seated marching x15, TA contractions 15x5\", glute sets 15x5\", HS curls x15    Manual Treatments:  None. Modalities:      Communication with other providers:  Updated POC to be faxed. Assessment:  (Response towards treatment session) (Pain Rating)  Initiated DKTC and TA contraction with LE. Pt responds well to session today. Encouraged pt to perform exercises at home when possible. Pain rating at end of session 5/10. Plan for Next Session:   Continue per POC.     Time In / Time Out:  1301/1345    Timed Code/Total Treatment Minutes:   40' (3 TE)    Next Progress Note due: April 8, 2022       Plan of Care Interventions:  [x] Therapeutic Exercise  [x] Modalities:  [x] Therapeutic Activity     [] Ultrasound  [x] Estim  [] Gait Training      [] Cervical Traction [] Lumbar Traction  [] Neuromuscular Re-education    [] Cold/hotpack [] Iontophoresis   [x] Instruction in HEP      [] Vasopneumatic   [] Dry Needling    [x] Manual Therapy               [] Aquatic Therapy              Electronically signed by:  Axel Sauer PTA   4/25/2022, 1:05 PM

## 2022-04-28 ENCOUNTER — HOSPITAL ENCOUNTER (OUTPATIENT)
Dept: PHYSICAL THERAPY | Age: 82
Setting detail: THERAPIES SERIES
Discharge: HOME | End: 2022-04-28
Payer: MEDICARE

## 2022-04-28 PROCEDURE — 97110 THERAPEUTIC EXERCISES: CPT

## 2022-04-28 NOTE — FLOWSHEET NOTE
Outpatient Physical Therapy  [place]           [] Phone: [phone]   Fax: [phone]  [facility]anc[facility] echols           [x] Phone: [phone]   Fax: [phone]        Physical Therapy Daily Treatment Note  Date:  2022    Patient Name:  Moustapha Pryor    :  1940  MRN: [id number]  Restrictions/Precautions:  fall risk   Diagnosis: Degenerative Lumbar Scoliosis (M41.86)     Treatment Diagnosis:    M62.81 muscle weakness, R26.89 abnormality of gait  Insurance/Certification information:   Medicare  Referring Physician:    Terry Zaragoza MD  Plan of care signed (Y/N): meme faxed. Outcome Measure: Oswestry: 48% disability  Visit# / total visits:      Pain level: 4/10  Goals:       Short term goals to be achieved by : deferred to long term goals      Long term goals to be achieved by :  2022  Long term goal 1: Pt will demonstrate I with current HEP as prescribed in order to increase strength and ROM. - not met, continue. Long term goal 2: Pt will demonstrate B hip flexion strength to 4/5 in order to improve strength. - MET, discharge goal.   Long term goal 3: Pt will demonstrate lumbar flexion to 10 inches in order to improve ROM. Long term goal 4: Pt will report Oswestry score no more than 20/50 in order to improve quality of life. - MET, discharge goal.     Subjective: Pt reports 4/10 pain in the LB today. Any changes in Ambulatory Summary Sheet? None    Objective:  Pt holds onto both // bars with step taps for safety.          COVID screening questions were asked and patient attested that there had been no contact or symptoms    Exercises: (No more than 4 columns)   Exercise/Equipment Date: 22            WARM UP       Nustep     x10' S11/A11 Lvl 5 500 steps x10' S11/A11 Lv5  535 steps x10' S11/A11 Lv5   496 steps x10' S11/A11 Lv5   526 steps          TABLE       Seated marches       TA contractions 1x20 5\" 1x20 5\" 1x25 5\" 1x25 5\"   Glute set

## 2022-05-02 ENCOUNTER — HOSPITAL ENCOUNTER (OUTPATIENT)
Dept: PHYSICAL THERAPY | Age: 82
Setting detail: THERAPIES SERIES
Discharge: HOME | End: 2022-05-02
Payer: MEDICARE

## 2022-05-02 PROCEDURE — 97110 THERAPEUTIC EXERCISES: CPT

## 2022-05-02 NOTE — FLOWSHEET NOTE
Outpatient Physical Therapy  [place]           [] Phone: [phone]   Fax: [phone]  [place]           [x] Phone: [phone]   Fax: [phone]        Physical Therapy Daily Treatment Note  Date:  2022    Patient Name:  Dorinda Borja    :  1940  MRN: [id number]  Restrictions/Precautions:  fall risk   Diagnosis: Degenerative Lumbar Scoliosis (M41.86)     Treatment Diagnosis:    M62.81 muscle weakness, R26.89 abnormality of gait  Insurance/Certification information:   Medicare  Referring Physician:    Meliton Ovalle MD  Plan of care signed (Y/N): maryal faxed. Outcome Measure: Oswestry: 48% disability  Visit# / total visits:      Pain level: 4/10  Goals:       Short term goals to be achieved by : deferred to long term goals      Long term goals to be achieved by :  2022  Long term goal 1: Pt will demonstrate I with current HEP as prescribed in order to increase strength and ROM. - not met, continue. Long term goal 2: Pt will demonstrate B hip flexion strength to 4/5 in order to improve strength. - MET, discharge goal.   Long term goal 3: Pt will demonstrate lumbar flexion to 10 inches in order to improve ROM. Long term goal 4: Pt will report Oswestry score no more than 20/50 in order to improve quality of life. - MET, discharge goal.     Subjective: Pt reports 4/10 pain in the LB today. Any changes in Ambulatory Summary Sheet? None    Objective:  Pt holds onto both // bars with step taps for safety.          COVID screening questions were asked and patient attested that there had been no contact or symptoms    Exercises: (No more than 4 columns)   Exercise/Equipment Date: 22 Date: 22             WARM UP        Nustep     x10' S11/A11 Lvl 5 500 steps x10' S11/A11 Lv5  535 steps x10' S11/A11 Lv5   496 steps x10' S11/A11 Lv5   526 steps x10' S11/A11 Lvl 5 540 steps           TABLE        Seated marches        TA contractions 1x20 5\" 1x20 5\" 1x25 5\" 1x25 5\"    Glute set      1x10 5\"   Bridges 1x20 5\" 1x20 5\" 20x5\" 20x5\" 1x10 5\"   TA contraction UE  10x L 15x L 15x L    TA contraction LE   10x B 10x 1x15 B LE   DKTC   10x 10x 1x15 B LE                      STANDING        HS curls  1x20 B LE 20x B 20x B 20x B 1x20 B LE   HS/slant board stretch 2x1' 2x1' 2x1' 2x1'    3 way hip  1x20 all B LE 1x20 all B LE 1x20 all B LE 1x20 all B LE 1x20 B LE   Heel raises  1x20 1x20 30x 30x 1x30 B LE   Step taps  6\" x2' 6\" 2' 6\" 2' 6\" 2.5' 6\" x2'   High knee marches 3 laps 3 laps next 3 laps 3 laps                                                PROPRIOCEPTION                                                MODALITIES                            Other Therapeutic Activities/Education:  Pt encouraged to perform HEP and reminded of it. Pt reports he will perform. Home Exercise Program:  2/24/22  Seated marching x15, TA contractions 15x5\", glute sets 15x5\", HS curls x15     Manual Treatments:  None. Modalities:      Communication with other providers:  Updated POC to be faxed. Assessment:  (Response towards treatment session) (Pain Rating) Pt does well with exercises this session with no adverse reactions noted. Pt report increased soreness in the LB at end of session, rated as a 5/10. Plan for Next Session:   Continue per POC.     Time In / Time Out: 1:00-1:45 pm    Timed Code/Total Treatment Minutes: 39' (3 TE)    Next Progress Note due: April 8, 2022       Plan of Care Interventions:  [x] Therapeutic Exercise  [x] Modalities:  [x] Therapeutic Activity     [] Ultrasound  [x] Estim   [] Gait Training      [] Cervical Traction [] Lumbar Traction  [] Neuromuscular Re-education    [] Cold/hotpack [] Iontophoresis   [x] Instruction in HEP      [] Vasopneumatic   [] Dry Needling    [x] Manual Therapy               [] Aquatic Therapy              Electronically signed by:  Yadi Gibbons, PT, PTA   5/2/2022, 1:05 PM

## 2022-05-05 ENCOUNTER — HOSPITAL ENCOUNTER (OUTPATIENT)
Dept: PHYSICAL THERAPY | Age: 82
Setting detail: THERAPIES SERIES
Discharge: HOME | End: 2022-05-05
Payer: MEDICARE

## 2022-05-05 PROCEDURE — 97112 NEUROMUSCULAR REEDUCATION: CPT

## 2022-05-05 PROCEDURE — 97110 THERAPEUTIC EXERCISES: CPT

## 2022-05-05 PROCEDURE — 97530 THERAPEUTIC ACTIVITIES: CPT

## 2022-05-05 NOTE — FLOWSHEET NOTE
Outpatient Physical Therapy  [place]           [] Phone: [phone]   Fax: [phone]  [place]           [x] Phone: [phone]   Fax: [phone]        Physical Therapy Daily Treatment Note  Date:  2022    Patient Name:  Pao Cantrell    :  1940  MRN: [id number]  Restrictions/Precautions:  fall risk   Diagnosis: Degenerative Lumbar Scoliosis (M41.86)     Treatment Diagnosis:    M62.81 muscle weakness, R26.89 abnormality of gait  Insurance/Certification information:   Medicare  Referring Physician:    Shanda Muñoz MD  Plan of care signed (Y/N): meme faxed. Outcome Measure: Oswestry: 48% disability  Visit# / total visits:      Pain level: 3/10  Goals:       Short term goals to be achieved by : deferred to long term goals      Long term goals to be achieved by :  2022  Long term goal 1: Pt will demonstrate I with current HEP as prescribed in order to increase strength and ROM. - not met, continue. Long term goal 2: Pt will demonstrate B hip flexion strength to 4/5 in order to improve strength. - MET, discharge goal.   Long term goal 3: Pt will demonstrate lumbar flexion to 10 inches in order to improve ROM. Long term goal 4: Pt will report Oswestry score no more than 20/50 in order to improve quality of life. - MET, discharge goal.     Subjective: Pt reports of 3/10 pain in his low back upon arrival.      Any changes in Ambulatory Summary Sheet?   None    Objective:  Non-compliant w/HEP      COVID screening questions were asked and patient attested that there had been no contact or symptoms    Exercises: (No more than 4 columns)   Exercise/Equipment 22 Date: 22           WARM UP      Nustep     x10' S11/A11 Lv5   526 steps x10' S11/A11 Lvl 5 540 steps S11/A11 Lv5 10'  560 steps         TABLE      Seated marches      TA contractions 1x25 5\"     Glute set   1x10 5\" 10x5\"   Bridges 20x5\" 1x10 5\" 20x5\"   TA contraction UE 15x L     TA contraction LE 10x 1x15 B LE 15x B   DKTC 10x 1x15 B LE 20x  W/peanut ball                  STANDING      HS curls  20x B 1x20 B LE At knee machine 20x B   HS/slant board stretch 2x1'  1'    3 way hip  1x20 all B LE 1x20 B LE 20x ea way B   Heel raises  30x 1x30 B LE 30x B   Step taps  6\" 2.5' 6\" x2'    High knee marches 3 laps 3 laps 3 laps                                      PROPRIOCEPTION                                    MODALITIES                      Other Therapeutic Activities/Education:  Pt encouraged to perform HEP and reminded of it. Pt reports he will perform. Home Exercise Program:  2/24/22  Seated marching x15, TA contractions 15x5\", glute sets 15x5\", HS curls x15     Manual Treatments:  None. Modalities:      Communication with other providers:  Updated POC to be faxed. Assessment:  (Response towards treatment session) (Pain Rating) Pt does well with exercises this session with no adverse reactions noted. Pt report increased soreness in the LB at end of session, rated as a 4/10. Plan for Next Session:   Continue per POC.     Time In / Time Out: 5705-9393    Timed Code/Total Treatment Minutes:  52 15ta 15nr 19te    Next Progress Note due: April 8, 2022       Plan of Care Interventions:  [x] Therapeutic Exercise  [x] Modalities:  [x] Therapeutic Activity     [] Ultrasound  [x] Estim   [] Gait Training      [] Cervical Traction [] Lumbar Traction  [] Neuromuscular Re-education    [] Cold/hotpack [] Iontophoresis   [x] Instruction in HEP      [] Vasopneumatic   [] Dry Needling    [x] Manual Therapy               [] Aquatic Therapy              Electronically signed by:  Jayy Hurtado PTA   5/5/2022, 11:18 AM

## 2022-05-10 ENCOUNTER — HOSPITAL ENCOUNTER (OUTPATIENT)
Dept: PHYSICAL THERAPY | Age: 82
Setting detail: THERAPIES SERIES
Discharge: HOME | End: 2022-05-10
Payer: MEDICARE

## 2022-05-10 PROCEDURE — 97110 THERAPEUTIC EXERCISES: CPT

## 2022-05-10 NOTE — FLOWSHEET NOTE
Outpatient Physical Therapy  [place]           [] Phone: [phone]   Fax: [phone]  [facility]           [x] Phone: [phone]   Fax: [phone]        Physical Therapy Daily Treatment Note  Date:  5/10/2022    Patient Name:  Maryjo Garcia    :  1940  MRN: [id number]  Restrictions/Precautions:  fall risk   Diagnosis: Degenerative Lumbar Scoliosis (M41.86)     Treatment Diagnosis:    M62.81 muscle weakness, R26.89 abnormality of gait  Insurance/Certification information:   Medicare  Referring Physician:    Mariana Paniagua MD  Plan of care signed (Y/N): meme faxed. Outcome Measure: Oswestry: 48% disability  Visit# / total visits:      Pain level: 3/10  Goals:       Short term goals to be achieved by : deferred to long term goals      Long term goals to be achieved by :  2022  Long term goal 1: Pt will demonstrate I with current HEP as prescribed in order to increase strength and ROM. - not met, continue. Long term goal 2: Pt will demonstrate B hip flexion strength to 4/5 in order to improve strength. - MET, discharge goal.   Long term goal 3: Pt will demonstrate lumbar flexion to 10 inches in order to improve ROM. Long term goal 4: Pt will report Oswestry score no more than 20/50 in order to improve quality of life. - MET, discharge goal.     Subjective: Pt reports of 3/10 pain in his low back upon arrival.  Any changes in Ambulatory Summary Sheet?   None    Objective:  Non-compliant w/HEP      COVID screening questions were asked and patient attested that there had been no contact or symptoms    Exercises: (No more than 4 columns)   Exercise/Equipment 22 Date: 5/2/22 5/5/22 5/10/22            WARM UP       Nustep     x10' S11/A11 Lv5   526 steps x10' S11/A11 Lvl 5 540 steps S11/A11 Lv5 10'  560 steps S11/A11 Lv5 10'   557 steps          TABLE       Seated marches       TA contractions 1x25 5\"      Glute set   1x10 5\" 10x5\" 15x5\"   Bridges 20x5\" 1x10 5\" 20x5\" 20x5\" TA contraction UE 15x L      TA contraction LE 10x 1x15 B LE 15x B 15x B   DKTC 10x 1x15 B LE 20x w/ peanut ball 20x w/ peanut ball                    STANDING       HS curls  20x B 1x20 B LE At knee machine 20x B At knee machine 20x B   HS/slant board stretch 2x1'  1'  1'    3 way hip  1x20 all B LE 1x20 B LE 20x ea way B 20x ea way B   Heel raises  30x 1x30 B LE 30x B 30x   Step taps  6\" 2.5' 6\" x2'     High knee marches 3 laps 3 laps 3 laps 3 laps                                           PROPRIOCEPTION                                          MODALITIES                         Other Therapeutic Activities/Education:  Pt encouraged to perform HEP and reminded of it. Pt reports he will perform. Home Exercise Program:  2/24/22  Seated marching x15, TA contractions 15x5\", glute sets 15x5\", HS curls x15     Manual Treatments:  None. Modalities:      Communication with other providers:  Updated POC to be faxed. Assessment:  (Response towards treatment session) (Pain Rating) Pt does well. Responds well to session today with no adverse reactions. Reminded pt of HEP and to complete when he can at home. Pt reports 3-4/10 pain at end of session. Plan for Next Session:   Continue per POC.     Time In / Time Out: 1357/1445    Timed Code/Total Treatment Minutes: 50' (3 TE)     Next Progress Note due: April 8, 2022       Plan of Care Interventions:  [x] Therapeutic Exercise  [x] Modalities:  [x] Therapeutic Activity     [] Ultrasound  [x] Estim   [] Gait Training      [] Cervical Traction [] Lumbar Traction  [] Neuromuscular Re-education    [] Cold/hotpack [] Iontophoresis   [x] Instruction in HEP      [] Vasopneumatic   [] Dry Needling    [x] Manual Therapy               [] Aquatic Therapy              Electronically signed by:  Axel Sauer PTA  5/10/2022, 1:56 PM

## 2022-05-12 ENCOUNTER — HOSPITAL ENCOUNTER (OUTPATIENT)
Dept: PHYSICAL THERAPY | Age: 82
Setting detail: THERAPIES SERIES
Discharge: HOME | End: 2022-05-12
Payer: MEDICARE

## 2022-05-12 PROCEDURE — 97110 THERAPEUTIC EXERCISES: CPT

## 2022-05-12 NOTE — FLOWSHEET NOTE
Outpatient Physical Therapy  [place]           [] Phone: [phone]   Fax: [phone]  Joe Bradshaw           [x] Phone: [phone]   Fax: [phone]        Physical Therapy Daily Treatment Note  Date:  2022    Patient Name:  Jess Ramirez    :  1940  MRN: [id number]  Restrictions/Precautions:  fall risk   Diagnosis: Degenerative Lumbar Scoliosis (M41.86)     Treatment Diagnosis:    M62.81 muscle weakness, R26.89 abnormality of gait  Insurance/Certification information:   Medicare  Referring Physician:    Umang Manzano MD  Plan of care signed (Y/N): meme faxed. Outcome Measure: Oswestry: 48% disability  Visit# / total visits:      Pain level: 3/10  Goals:       Short term goals to be achieved by : deferred to long term goals      1) ) Pt will demonstrate I with current HEP as prescribed in order to increase strength and ROM. 2) Pt will demonstrate R SB FTTF  no more than 2 inches different than L SB FTTF in order to improve ROM. 3) Pt will report worst pain in the last week no more than 5/10 in order to improve quality of life.      Subjective: Pt reports of 3/10 pain in his low back upon arrival. Pt reports worst pain 8-9/10 in low back. Any changes in Ambulatory Summary Sheet?   None    Objective:  Non-compliant w/HEP  FTTF R SB: 9.5\" FTTF L SB: 11.5\"   FTTF: 8\"      COVID screening questions were asked and patient attested that there had been no contact or symptoms    Exercises: (No more than 4 columns)   Exercise/Equipment Date: 5/2/22 5/5/22 5/10/22 Date: 22            WARM UP       Nustep     x10' S11/A11 Lvl 5 540 steps S11/A11 Lv5 10'  560 steps S11/A11 Lv5 10'   557 steps 10' S11/A11 Lvl 5 606 steps           TABLE       Seated marches       TA contractions       Glute set  1x10 5\" 10x5\" 15x5\" 1x15 5\"   Bridges 1x10 5\" 20x5\" 20x5\" 1x15 5\"   TA contraction UE       TA contraction LE 1x15 B LE 15x B 15x B 1x20 B LE   DKTC 1x15 B LE 20x w/ peanut ball 20x w/ peanut ball 1x20 peanut ball                    STANDING       HS curls  1x20 B LE At knee machine 20x B At knee machine 20x B    HS/slant board stretch  1'  1'  x1'   3 way hip  1x20 B LE 20x ea way B 20x ea way B 1x20 B LE   Heel raises  1x30 B LE 30x B 30x 1x30   Step taps  6\" x2'      High knee marches 3 laps 3 laps 3 laps 3 laps                                           PROPRIOCEPTION                                          MODALITIES                       Other Therapeutic Activities/Education:  Pt encouraged to perform HEP. Home Exercise Program:  2/24/22  Seated marching x15, TA contractions 15x5\", glute sets 15x5\", HS curls x15     Manual Treatments:  None. Modalities:      Communication with other providers:  Discharge to be faxed    Assessment:  (Response towards treatment session) (Pain Rating) Pt does well. Responds well to session today with no adverse reactions. Reminded pt of HEP and to complete when he can at home. Pt reports 3-4/10 pain at end of session. Plan for Next Session:   pt to be discharged.      Time In / Time Out: 2:17-3:00 pm    Timed Code/Total Treatment Minutes: 39' (3 TE)     Next Progress Note due: 5/18/22      Plan of Care Interventions:  [x] Therapeutic Exercise  [x] Modalities:  [x] Therapeutic Activity     [] Ultrasound  [x] Estim   [] Gait Training      [] Cervical Traction [] Lumbar Traction  [] Neuromuscular Re-education    [] Cold/hotpack [] Iontophoresis   [x] Instruction in HEP      [] Vasopneumatic   [] Dry Needling    [x] Manual Therapy               [] Aquatic Therapy              Electronically signed by:  Vale Pierre PT, DPT [id number]   5/12/2022, 2:17 PM

## 2022-07-21 ENCOUNTER — HOSPITAL ENCOUNTER (OUTPATIENT)
Age: 82
Discharge: HOME | End: 2022-07-21
Payer: MEDICARE

## 2022-07-21 PROCEDURE — 36415 COLL VENOUS BLD VENIPUNCTURE: CPT

## 2022-07-21 PROCEDURE — 87449 NOS EACH ORGANISM AG IA: CPT

## 2022-07-21 PROCEDURE — 87324 CLOSTRIDIUM AG IA: CPT

## 2022-07-22 LAB
C DIFF AG + TOXIN: NORMAL
SOURCE: NORMAL

## 2022-10-25 ENCOUNTER — HOSPITAL ENCOUNTER (OUTPATIENT)
Age: 82
Discharge: HOME | End: 2022-10-25
Payer: MEDICARE

## 2022-10-25 LAB
ALBUMIN SERPL-MCNC: 3.6 GM/DL (ref 3.4–5)
ALP BLD-CCNC: 101 IU/L (ref 40–129)
ALT SERPL-CCNC: 15 U/L (ref 10–40)
ANION GAP SERPL CALCULATED.3IONS-SCNC: 8 MMOL/L (ref 4–16)
AST SERPL-CCNC: 24 IU/L (ref 15–37)
BASOPHILS ABSOLUTE: 0.1 K/CU MM
BASOPHILS RELATIVE PERCENT: 0.9 % (ref 0–1)
BILIRUB SERPL-MCNC: 0.4 MG/DL (ref 0–1)
BUN BLDV-MCNC: 17 MG/DL (ref 6–23)
CALCIUM SERPL-MCNC: 9.5 MG/DL (ref 8.3–10.6)
CHLORIDE BLD-SCNC: 107 MMOL/L (ref 99–110)
CO2: 30 MMOL/L (ref 21–32)
CREAT SERPL-MCNC: 0.8 MG/DL (ref 0.9–1.3)
DIFFERENTIAL TYPE: ABNORMAL
EOSINOPHILS ABSOLUTE: 0.4 K/CU MM
EOSINOPHILS RELATIVE PERCENT: 6.7 % (ref 0–3)
GFR SERPL CREATININE-BSD FRML MDRD: >60 ML/MIN/1.73M2
GLUCOSE FASTING: 86 MG/DL (ref 70–99)
HCT VFR BLD CALC: 39.1 % (ref 42–52)
HEMOGLOBIN: 12.6 GM/DL (ref 13.5–18)
IMMATURE NEUTROPHIL %: 0.2 % (ref 0–0.43)
LYMPHOCYTES ABSOLUTE: 1.3 K/CU MM
LYMPHOCYTES RELATIVE PERCENT: 21.9 % (ref 24–44)
MCH RBC QN AUTO: 30.8 PG (ref 27–31)
MCHC RBC AUTO-ENTMCNC: 32.2 % (ref 32–36)
MCV RBC AUTO: 95.6 FL (ref 78–100)
MONOCYTES ABSOLUTE: 0.6 K/CU MM
MONOCYTES RELATIVE PERCENT: 10.5 % (ref 0–4)
PDW BLD-RTO: 13.2 % (ref 11.7–14.9)
PLATELET # BLD: 150 K/CU MM (ref 140–440)
PMV BLD AUTO: 11 FL (ref 7.5–11.1)
POTASSIUM SERPL-SCNC: 4.2 MMOL/L (ref 3.5–5.1)
RBC # BLD: 4.09 M/CU MM (ref 4.6–6.2)
SEGMENTED NEUTROPHILS ABSOLUTE COUNT: 3.5 K/CU MM
SEGMENTED NEUTROPHILS RELATIVE PERCENT: 59.8 % (ref 36–66)
SODIUM BLD-SCNC: 145 MMOL/L (ref 135–145)
TOTAL IMMATURE NEUTOROPHIL: 0.01 K/CU MM
TOTAL PROTEIN: 6.3 GM/DL (ref 6.4–8.2)
TSH HIGH SENSITIVITY: 1.21 UIU/ML (ref 0.27–4.2)
WBC # BLD: 5.8 K/CU MM (ref 4–10.5)

## 2022-10-25 PROCEDURE — 84443 ASSAY THYROID STIM HORMONE: CPT

## 2022-10-25 PROCEDURE — 36415 COLL VENOUS BLD VENIPUNCTURE: CPT

## 2022-10-25 PROCEDURE — 80053 COMPREHEN METABOLIC PANEL: CPT

## 2022-10-25 PROCEDURE — 85025 COMPLETE CBC W/AUTO DIFF WBC: CPT

## 2022-11-18 ENCOUNTER — APPOINTMENT (OUTPATIENT)
Dept: CT IMAGING | Age: 82
End: 2022-11-18
Payer: MEDICARE

## 2022-11-18 ENCOUNTER — HOSPITAL ENCOUNTER (EMERGENCY)
Age: 82
Discharge: HOME | End: 2022-11-18
Attending: EMERGENCY MEDICINE
Payer: MEDICARE

## 2022-11-18 ENCOUNTER — APPOINTMENT (OUTPATIENT)
Dept: GENERAL RADIOLOGY | Age: 82
End: 2022-11-18
Payer: MEDICARE

## 2022-11-18 VITALS
BODY MASS INDEX: 26.52 KG/M2 | DIASTOLIC BLOOD PRESSURE: 75 MMHG | RESPIRATION RATE: 20 BRPM | HEART RATE: 66 BPM | TEMPERATURE: 98 F | OXYGEN SATURATION: 100 % | WEIGHT: 175 LBS | SYSTOLIC BLOOD PRESSURE: 172 MMHG | HEIGHT: 68 IN

## 2022-11-18 DIAGNOSIS — S09.90XA INJURY OF HEAD, INITIAL ENCOUNTER: ICD-10-CM

## 2022-11-18 DIAGNOSIS — S02.5XXA CLOSED FRACTURE OF TOOTH, INITIAL ENCOUNTER: ICD-10-CM

## 2022-11-18 DIAGNOSIS — S01.511A LIP LACERATION, INITIAL ENCOUNTER: ICD-10-CM
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
## 2022-11-18 DIAGNOSIS — S01.111A RIGHT EYELID LACERATION, INITIAL ENCOUNTER: ICD-10-CM

## 2022-11-18 LAB
ALBUMIN SERPL-MCNC: 3.8 GM/DL (ref 3.4–5)
ALP BLD-CCNC: 93 IU/L (ref 40–129)
ALT SERPL-CCNC: 24 U/L (ref 10–40)
ANION GAP SERPL CALCULATED.3IONS-SCNC: 9 MMOL/L (ref 4–16)
APTT: 25.3 SECONDS (ref 25.1–37.1)
AST SERPL-CCNC: 21 IU/L (ref 15–37)
BASOPHILS ABSOLUTE: 0.1 K/CU MM
BASOPHILS RELATIVE PERCENT: 0.6 % (ref 0–1)
BILIRUB SERPL-MCNC: 0.4 MG/DL (ref 0–1)
BUN BLDV-MCNC: 31 MG/DL (ref 6–23)
CALCIUM SERPL-MCNC: 9 MG/DL (ref 8.3–10.6)
CHLORIDE BLD-SCNC: 109 MMOL/L (ref 99–110)
CO2: 24 MMOL/L (ref 21–32)
CREAT SERPL-MCNC: 1 MG/DL (ref 0.9–1.3)
DIFFERENTIAL TYPE: ABNORMAL
EKG ATRIAL RATE: 62 BPM
EKG DIAGNOSIS: NORMAL
EKG P AXIS: 57 DEGREES
EKG P-R INTERVAL: 120 MS
EKG Q-T INTERVAL: 422 MS
EKG QRS DURATION: 88 MS
EKG QTC CALCULATION (BAZETT): 428 MS
EKG R AXIS: 48 DEGREES
EKG T AXIS: 55 DEGREES
EKG VENTRICULAR RATE: 62 BPM
EOSINOPHILS ABSOLUTE: 0.3 K/CU MM
EOSINOPHILS RELATIVE PERCENT: 2.8 % (ref 0–3)
GFR SERPL CREATININE-BSD FRML MDRD: >60 ML/MIN/1.73M2
GLUCOSE BLD-MCNC: 85 MG/DL (ref 70–99)
HCT VFR BLD CALC: 39.9 % (ref 42–52)
HEMOGLOBIN: 12.9 GM/DL (ref 13.5–18)
IMMATURE NEUTROPHIL %: 0.9 % (ref 0–0.43)
INR BLD: 0.93 INDEX
LYMPHOCYTES ABSOLUTE: 1.4 K/CU MM
LYMPHOCYTES RELATIVE PERCENT: 14.9 % (ref 24–44)
MCH RBC QN AUTO: 31.2 PG (ref 27–31)
MCHC RBC AUTO-ENTMCNC: 32.3 % (ref 32–36)
MCV RBC AUTO: 96.6 FL (ref 78–100)
MONOCYTES ABSOLUTE: 0.8 K/CU MM
MONOCYTES RELATIVE PERCENT: 8.2 % (ref 0–4)
PDW BLD-RTO: 13.5 % (ref 11.7–14.9)
PLATELET # BLD: 157 K/CU MM (ref 140–440)
PMV BLD AUTO: 10.6 FL (ref 7.5–11.1)
POTASSIUM SERPL-SCNC: 3.9 MMOL/L (ref 3.5–5.1)
PROTHROMBIN TIME: 11.2 SECONDS (ref 11.7–14.5)
RBC # BLD: 4.13 M/CU MM (ref 4.6–6.2)
SEGMENTED NEUTROPHILS ABSOLUTE COUNT: 6.8 K/CU MM
SEGMENTED NEUTROPHILS RELATIVE PERCENT: 72.6 % (ref 36–66)
SODIUM BLD-SCNC: 142 MMOL/L (ref 135–145)
TOTAL IMMATURE NEUTOROPHIL: 0.08 K/CU MM
TOTAL PROTEIN: 6.3 GM/DL (ref 6.4–8.2)
TROPONIN T: <0.01 NG/ML
WBC # BLD: 9.4 K/CU MM (ref 4–10.5)

## 2022-11-18 PROCEDURE — 99285 EMERGENCY DEPT VISIT HI MDM: CPT

## 2022-11-18 PROCEDURE — 80053 COMPREHEN METABOLIC PANEL: CPT

## 2022-11-18 PROCEDURE — 71045 X-RAY EXAM CHEST 1 VIEW: CPT

## 2022-11-18 PROCEDURE — 2500000003 HC RX 250 WO HCPCS: Performed by: EMERGENCY MEDICINE

## 2022-11-18 PROCEDURE — 93005 ELECTROCARDIOGRAM TRACING: CPT | Performed by: EMERGENCY MEDICINE

## 2022-11-18 PROCEDURE — 90471 IMMUNIZATION ADMIN: CPT | Performed by: EMERGENCY MEDICINE

## 2022-11-18 PROCEDURE — 72170 X-RAY EXAM OF PELVIS: CPT

## 2022-11-18 PROCEDURE — 84484 ASSAY OF TROPONIN QUANT: CPT

## 2022-11-18 PROCEDURE — 85730 THROMBOPLASTIN TIME PARTIAL: CPT

## 2022-11-18 PROCEDURE — 93010 ELECTROCARDIOGRAM REPORT: CPT | Performed by: INTERNAL MEDICINE

## 2022-11-18 PROCEDURE — 6360000002 HC RX W HCPCS: Performed by: EMERGENCY MEDICINE

## 2022-11-18 PROCEDURE — 12011 RPR F/E/E/N/L/M 2.5 CM/<: CPT

## 2022-11-18 PROCEDURE — 85025 COMPLETE CBC W/AUTO DIFF WBC: CPT

## 2022-11-18 PROCEDURE — 70450 CT HEAD/BRAIN W/O DYE: CPT

## 2022-11-18 PROCEDURE — 85610 PROTHROMBIN TIME: CPT

## 2022-11-18 PROCEDURE — 70486 CT MAXILLOFACIAL W/O DYE: CPT

## 2022-11-18 PROCEDURE — 72125 CT NECK SPINE W/O DYE: CPT

## 2022-11-18 PROCEDURE — 90715 TDAP VACCINE 7 YRS/> IM: CPT | Performed by: EMERGENCY MEDICINE

## 2022-11-18 RX ORDER — LIDOCAINE HYDROCHLORIDE AND EPINEPHRINE BITARTRATE 20; .01 MG/ML; MG/ML
20 INJECTION, SOLUTION SUBCUTANEOUS ONCE
Status: COMPLETED | OUTPATIENT
Start: 2022-11-18 | End: 2022-11-18

## 2022-11-18 RX ADMIN — LIDOCAINE HYDROCHLORIDE AND EPINEPHRINE 20 ML: 20; 10 INJECTION, SOLUTION INFILTRATION; PERINEURAL at 15:13

## 2022-11-18 RX ADMIN — TETANUS TOXOID, REDUCED DIPHTHERIA TOXOID AND ACELLULAR PERTUSSIS VACCINE, ADSORBED 0.5 ML: 5; 2.5; 8; 8; 2.5 SUSPENSION INTRAMUSCULAR at 15:12

## 2022-11-18 ASSESSMENT — LIFESTYLE VARIABLES
HOW MANY STANDARD DRINKS CONTAINING ALCOHOL DO YOU HAVE ON A TYPICAL DAY: PATIENT DOES NOT DRINK
HOW OFTEN DO YOU HAVE A DRINK CONTAINING ALCOHOL: NEVER

## 2022-11-18 ASSESSMENT — PAIN - FUNCTIONAL ASSESSMENT: PAIN_FUNCTIONAL_ASSESSMENT: NONE - DENIES PAIN

## 2022-11-18 NOTE — ED NOTES
C=spine immobilization held and c-collar applied with assistance     Aarti Calloway RN  11/18/22 3137

## 2022-11-18 NOTE — ED TRIAGE NOTES
Fall in driveway with + LOC and no recollection of event. Laceration to upper inside of mouth and above right eye. Bleeding controlled.

## 2022-11-18 NOTE — ED NOTES
Discharge instructions reviewed with patient, patients wife and patients granddaughter. No additional questions asked. Voiced understanding. Encouraged patient to follow up as discussed by the ED physician.      Ann Ayala RN  11/18/22 5722

## 2022-11-18 NOTE — ED PROVIDER NOTES
Emergency Department Encounter    Patient: Dena Guajardo  MRN: [id number]  : 1940  Date of Evaluation: 2022  ED Provider:  Lazaro Agosto MD    Triage Chief Complaint:   Fall    [place]:  Dena Guajardo is a 78 y.o. male that presents with concern for a fall. Was bringing in recycling bins for himself and neighbors which she does every week. Seen that he went down, not clear if he passed out first or fell and passed out. He has a laceration to just above the right eye, inside the right upper lip. He was still mildly confused, can move all extremities. He does not recall what occurred. He denies chest pain and difficulty breathing. He can move everything. He denies weakness. States there is some soreness in his neck but not in his back. No abdominal pain. Denies blood thinners. 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   Social History     Socioeconomic History    Marital status:      Spouse name: Not on file    Number of children: Not on file    Years of education: Not on file    Highest education level: Not on file   Occupational History    Not on file   Tobacco Use    Smoking status: Never    Smokeless tobacco: Never   Vaping Use    Vaping Use: Never used   Substance and Sexual Activity    Alcohol use: No    Drug use: Never    Sexual activity: Not Currently   Other Topics Concern    Not on file   Social History Narrative    Not on file     Social Determinants of Health Financial Resource Strain: Not on file   Food Insecurity: Not on file   Transportation Needs: Not on file   Physical Activity: Not on file   Stress: Not on file   Social Connections: Not on file   Intimate Partner Violence: Not on file   Housing Stability: Not on file     No current facility-administered medications for this encounter. Current Outpatient Medications   Medication Sig Dispense Refill    CPAP Machine MISC by CPAP route nightly Indications: auto pap 4-20      tamsulosin (FLOMAX) 0.4 MG capsule Take 0.4 mg by mouth daily      famotidine (PEPCID) 20 MG tablet Take 1 tablet by mouth 2 times daily for 7 days. 14 tablet 0    folic acid (FOLVITE) 1 MG tablet Take 1 mg by mouth daily. amLODIPine-benazepril (LOTREL) 5-10 MG per capsule Take 1 capsule by mouth daily. montelukast (SINGULAIR) 10 MG tablet Take 10 mg by mouth nightly. mometasone (NASONEX) 50 MCG/ACT nasal spray 2 sprays by Nasal route daily. Ascorbic Acid (VITAMIN C) 500 MG tablet Take 1,000 mg by mouth daily. Cholecalciferol (VITAMIN D3) 2000 UNITS CAPS Take 1 tablet by mouth. ammonium lactate (AMLACTIN) 12 % cream Apply  topically as needed. Apply topically as needed. Calcipotriene-Betameth Diprop (TACLONEX EX) Apply  topically. Applied Saturday and Sunday only       Misc Natural Products (OSTEO BI-FLEX JOINT SHIELD PO) Take 2 tablets by mouth daily. Allergies   Allergen Reactions    Erythromycin     Lactose     Sulfa Antibiotics        Nursing Notes Reviewed    Physical Exam:  ED Triage Vitals [11/18/22 1259]   Enc Vitals Group      BP (!) 172/75      Heart Rate 66      Resp 20      Temp 98 °F (36.7 °C)      Temp Source Temporal      SpO2 100 %      Weight 175 lb (79.4 kg)      Height 5' 8\" (1.727 m)      Head Circumference       Peak Flow       Pain Score       Pain Loc       Pain Edu? Excl. in [address]?              My pulse ox interpretation is - normal    Primary exam:  Airway: Intact. Speaking in normal voice. Breathing: Spontaneous. Equal chest rise and breath sounds. Circulation: Heart RRR. Pulses 2+. Secondary exam:   GENERAL APPEARANCE: Awake and alert. Cooperative. No acute distress. Laying in the bed in a hard cervical collar  HEAD: Normocephalic. Right upper eyelid/under eyebrow there is a laceration, 2.5cm and does gape a small amount. No depressed skull fractures. EYES: EOM's grossly intact. Sclera anicteric. No [place] Eyes. ENT: Oral mucosa moist, laceration to inside of upper right lip, does not cross vermillion border, 1cm. Tolerates saliva. No Izaguirre sign. NECK: Trachea midline, no obvious masses  CHEST/LUNGS: Non-tender. Lungs clear to auscultation bilaterally. Respirations nonlabored. HEART: Regular rate and rhythm. ABDOMEN: Soft. Non-distended. Non-tender. No guarding or rebound. Normal bowel sounds. BACK:  No cervical thoracic or lumbar midline tenderness to palpation, step-offs or deformities. EXTREMITIES: Upper and lower extremities have no acute deformities and they are non-tender to palpation. Good ROM. SKIN: Warm and dry. No acute wounds  NEUROLOGICAL: Alert and oriented. No gross facial drooping. Strength 5/5 in upper and lower extremities Light touch sensation intact throughout.   Perfusion:  pulses intact and equal in all extremities      I have reviewed and interpreted all of the currently available lab results from this visit (if applicable):  Results for orders placed or performed during the hospital encounter of 11/18/22   CBC with Auto Differential   Result Value Ref Range    WBC 9.4 4.0 - 10.5 K/CU MM    RBC 4.13 (L) 4.6 - 6.2 M/CU MM    Hemoglobin 12.9 (L) 13.5 - 18.0 GM/DL    Hematocrit 39.9 (L) 42 - 52 %    MCV 96.6 78 - 100 FL    MCH 31.2 (H) 27 - 31 PG    MCHC 32.3 32.0 - 36.0 %    RDW 13.5 11.7 - 14.9 %    Platelets 677 625 - 892 K/CU MM    MPV 10.6 7.5 - 11.1 FL    Differential Type AUTOMATED DIFFERENTIAL     Segs Relative 72.6 (H) 36 - 66 %    Lymphocytes % 14.9 (L) 24 - 44 %    Monocytes % 8.2 (H) 0 - 4 %    Eosinophils % 2.8 0 - 3 %    Basophils % 0.6 0 - 1 %    Segs Absolute 6.8 K/CU MM    Lymphocytes Absolute 1.4 K/CU MM    Monocytes Absolute 0.8 K/CU MM    Eosinophils Absolute 0.3 K/CU MM    Basophils Absolute 0.1 K/CU MM    Immature Neutrophil % 0.9 (H) 0 - 0.43 %    Total Immature Neutrophil 0.08 K/CU MM   Comprehensive Metabolic Panel   Result Value Ref Range    Sodium 142 135 - 145 MMOL/L    Potassium 3.9 3.5 - 5.1 MMOL/L    Chloride 109 99 - 110 mMol/L    CO2 24 21 - 32 MMOL/L    BUN 31 (H) 6 - 23 MG/DL    Creatinine 1.0 0.9 - 1.3 MG/DL    Est, Glom Filt Rate >60 >60 mL/min/1.73m2    Glucose 85 70 - 99 MG/DL    Calcium 9.0 8.3 - 10.6 MG/DL    Albumin 3.8 3.4 - 5.0 GM/DL    Total Protein 6.3 (L) 6.4 - 8.2 GM/DL    Total Bilirubin 0.4 0.0 - 1.0 MG/DL    ALT 24 10 - 40 U/L    AST 21 15 - 37 IU/L    Alkaline Phosphatase 93 40 - 129 IU/L    Anion Gap 9 4 - 16   Troponin   Result Value Ref Range    Troponin T <0.010 <0.01 NG/ML   Protime/INR & PTT   Result Value Ref Range    Protime 11.2 (L) 11.7 - 14.5 SECONDS    INR 0.93 INDEX    aPTT 25.3 25.1 - 37.1 SECONDS   EKG 12 Lead   Result Value Ref Range    Ventricular Rate 62 BPM    Atrial Rate 62 BPM    P-R Interval 120 ms    QRS Duration 88 ms    Q-T Interval 422 ms    QTc Calculation (Bazett) 428 ms    P Axis 57 degrees    R Axis 48 degrees    T Axis 55 degrees    Diagnosis       Normal sinus rhythm  Normal ECG  No previous ECGs available        Radiographs (if obtained):  Radiologist's Report Reviewed:  No results found. EKG (if obtained): (All EKG's are interpreted by myself in the absence of a cardiologist)  Normal sinus rhythm with a rate of 62 bpm, normal intervals. No ST elevation. Normal EKG. No previous to compare        MDM:  78-year-old male with history as above presents with concern for fall.   He does not recall all of the events of the fall, not clear if he passed out first or fell and then passed out. Wife is on her way. We will plan for CT face, head and cervical spine given the injury, as well as chest x-ray and pelvic x-ray. He is comfortable with this, started labs as well given his age    16- wife had arrived and able to give more history. A neighbor saw him fall, saw him trip on a flower bed rock and went down with bins in his hands so did not protect his face/etc.     Labs are stable can compared to previous. He is not significantly anemic, his creatinine is stable, his electrolytes are normal.  Imaging shows no evidence of traumatic injury other than the lacerations as above. Did discuss with wife and patient, now that we have the c-collar off and I am able to evaluate it does appear that he has 1 broken tooth, can follow-up with dentist regarding this. It is not coming out, does not require splinting. Did offer prophylactic antibiotics but he does have multiple antibiotic allergies and he just gotten over a months worth of diarrhea. Wife is a former nurse and comfortable with watching and seeing how he does, knows the signs of infection to watch for. The right eyelid laceration does appear will require sutures. They are comfortable with this, procedure note below. The lip laceration is not through the vermilion border, not the lower lip and so not likely to get food caught in it, they are comfortable with not selling at this time. Laceration Repair Procedure Note    Indication: Laceration    Procedure: The patient was placed in the appropriate position and anesthesia around the laceration was obtained by infiltration using 2% Lidocaine with epinephrine. The area was then cleansed with betadine and draped in a sterile fashion. The laceration was closed with 5-0 Ethilon using interrupted sutures. There were no additional lacerations requiring repair. The wound area was then dressed with gauze. Total repaired wound length: 2.5 cm.      Other Items: Suture count: 6    The patient tolerated the procedure well. Complications: None      Clinical Impression:  1. Fall, initial encounter    2. Injury of head, initial encounter    3. Right eyelid laceration, initial encounter    4. Lip laceration, initial encounter    5. Closed fracture of tooth, initial encounter      Disposition referral (if applicable):  MD Tami [address] [address]  [phone]      For suture removal in 4-5 days  Disposition medications (if applicable):  Discharge Medication List as of 11/18/2022  2:57 PM          Comment: Please note this report has been produced using speech recognition software and may contain errors related to that system including errors in grammar, punctuation, and spelling, as well as words and phrases that may be inappropriate. Efforts were made to edit the dictations.         Sonja Menjivar MD  11/18/22 4628

## 2022-11-19 NOTE — PROGRESS NOTES
formerly [facility]  [address] [place] [id number]  Phone: [phone]  Fax: [phone]      Physician: Dr Benjamin Harris MD      From: Odalys Bridges, PT,     Patient: Halley Vitale                    : 1940  Diagnosis: Degenerative Lumbar Scoliosis (M41.86)   Date: 2022  Treatment Diagnosis:  M62.81 muscle weakness, R26.89 abnormality of gait    [x]  Progress Note                []  Discharge Note    Total Visits to date:  20 Cancels/No-shows to date:  0    Subjective: 22 : Pt reports of 3/10 pain in his low back upon arrival. Pt reports worst pain 8-9/10 in low back. Plan of Care/Treatment to date:  [x] Therapeutic Exercise    [x] Modalities:  [x] Therapeutic Activity     [] Ultrasound  [x] Electric Stimulation  [x] Gait Training      [] Cervical Traction    [] Lumbar Traction  [x] Neuromuscular Re-education  [] Cold/hotpack [] Iontophoresis  [x] Instruction in HEP      Other:  [] Manual Therapy       []  Vasopneumatic  [] Aquatic Therapy       []                          Objective/Significant Findings At Last Visit/Comments:    Oswestry:  = 40% disability Non-compliant w/HEP  FTTF R SB: 9.5\" FTTF L SB: 11.5\"   FTTF: 8\"       Strength LE  Hip:    Right Left   Hip flexion         4/5        4/      Assessment: Pt is a pleasant 77 yo male who would benefit from continuation of PT for 1 month to address decreased ROM, strength, balance, and functional mobility as well as increased pain. Goal Status:  [] Achieved [x] Partially Achieved  [] Not Achieved   1) Pt will demonstrate I with current HEP as prescribed in order to increase strength and ROM. - not met, continue. 2) Pt will demonstrate R SB FTTF  no more than 2 inches different than L SB FTTF in order to improve ROM. 3) Pt will report worst pain in the last week no more than 5/10 in order to improve quality of life.      Updated Goals to be continued through May 18, 2022:  1) ) Pt will demonstrate I with current HEP as prescribed in order to increase strength and ROM. 2) Pt will demonstrate R SB FTTF  no more than 2 inches different than L SB FTTF in order to improve ROM. 3) Pt will report worst pain in the last week no more than 5/10 in order to improve quality of life. [x] Patient now discharged- has not scheduled further OP PT    Electronically signed by:  Anum Vora PT, 11/19/2022, 10:12 AM    If you have any questions or concerns, please don't hesitate to call.   Thank you for your referral.

## 2023-05-02 ENCOUNTER — HOSPITAL ENCOUNTER (OUTPATIENT)
Age: 83
Discharge: HOME | End: 2023-05-02
Payer: MEDICARE

## 2023-05-02 LAB
FOLATE SERPL-MCNC: 14.8 NG/ML (ref 3.1–17.5)
VITAMIN B-12: 1698 PG/ML (ref 211–911)

## 2023-05-02 PROCEDURE — 82607 VITAMIN B-12: CPT

## 2023-05-02 PROCEDURE — 36415 COLL VENOUS BLD VENIPUNCTURE: CPT

## 2023-05-02 PROCEDURE — 82746 ASSAY OF FOLIC ACID SERUM: CPT

## 2023-06-05 ENCOUNTER — HOSPITAL ENCOUNTER (OUTPATIENT)
Dept: PHYSICAL THERAPY | Age: 83
Setting detail: THERAPIES SERIES
Discharge: HOME | End: 2023-06-05
Payer: MEDICARE

## 2023-06-05 PROCEDURE — 97162 PT EVAL MOD COMPLEX 30 MIN: CPT

## 2023-06-05 NOTE — PROGRESS NOTES
Isabela Rossi MD        Physician Comments: _______________________________________________    Please sign and return to [facility]. Please fax to the location listed below.  [facility] YOU for this referral!    [address]  [address] [address]  Dept: [address]: [phone]       POC NOTE

## 2023-06-05 NOTE — FLOWSHEET NOTE
Outpatient Physical Therapy  [place]           [] Phone: [phone]   Fax: [phone]  [place]           [x] Phone: [phone]   Fax: [phone]        Physical Therapy Daily Treatment Note  Date:  2023    Patient Name:  Aurora Malin    :  1940  MRN: [id number]  Restrictions/Precautions: No data recorded   Position Activity Restriction  Other position/activity restrictions: none  Diagnosis:   No admission diagnoses are documented for this encounter. Diagnosis: Lumbar degen arthritis  Date of Injury/Surgery:   Treatment Diagnosis:  weakness, debility,unsteady gait  Insurance/Certification information: medicare  Referring Physician:  MD Maritza Gonzalez   PCP: Gillian Brooks MD  Next Doctor Visit:    Plan of care signed (Y/N):    Outcome Measure:   Visit# / total visits:   1/10 then PN  Pain level: 010   Goals:     Patient goals: want body to feel better  Long Term Goals  Time Frame for Long Term Goals: 6 weeks plan expires 23  I in home program.  patient will complete TUG in 23 seconds               Summary of Evaluation:  Assessment: TUG  28 sec  ASSESSMENT  Patient primary complaints: unsteady with waking/ lack of energy/back pain with ADL-IADL  History of condition:more intense LBP for past 2 years  Current functional limitations: limited IADL  Clinical findings:B LE weakness  PLOF:Pt was independent with ADLs/IADLs . Retired,   Patient's response to treatment: Pt tolerated  treatment without any adverse reactions or complications this date. . Pt would continue to benefit from skilled therapy interventions to address remaining impairments, improve mobility and strength,  and progress toward goal completion and prepare for d/c including finalizing HEP ;  while reducing risk for re-injury or further decline. Pain complaints after session 0/10   Skilled PT interventions are intended to:   establish HEP which will enable patient  to improve quality of

## 2023-06-05 NOTE — PLAN OF CARE
PM  If you have any questions or concerns, please don't hesitate to call.   Thank you for your referral.      Physician Signature:________________________________Date:_________ TIME: _____  By signing above, therapists plan is approved by physician

## 2023-06-07 ENCOUNTER — HOSPITAL ENCOUNTER (OUTPATIENT)
Dept: PHYSICAL THERAPY | Age: 83
Setting detail: THERAPIES SERIES
Discharge: HOME | End: 2023-06-07
Payer: MEDICARE

## 2023-06-07 PROCEDURE — 97110 THERAPEUTIC EXERCISES: CPT

## 2023-06-07 PROCEDURE — 97112 NEUROMUSCULAR REEDUCATION: CPT

## 2023-06-07 NOTE — FLOWSHEET NOTE
1 NR, 2 TE      Next Progress Note due:        Plan of Care Interventions:  [x] Therapeutic Exercise  [] Modalities:  [x] Therapeutic Activity     [] Ultrasound  [] Estim  [x] Gait Training      [] Cervical Traction [] Lumbar Traction  [x] Neuromuscular Re-education    [] Cold/hotpack [] Iontophoresis   [] Instruction in HEP      [] Vasopneumatic   [] Dry Needling    [] Manual Therapy               [] Aquatic Therapy              Electronically signed by:  Brenda Posada PTA  6/7/2023, 3:19 PM

## 2023-06-20 ENCOUNTER — HOSPITAL ENCOUNTER (OUTPATIENT)
Dept: PHYSICAL THERAPY | Age: 83
Discharge: HOME | End: 2023-06-20

## 2023-06-20 NOTE — FLOWSHEET NOTE
Physical Therapy  Cancellation/No-show Note  Patient Name:  Yo Bell  :  1940   Date:  2023  Cancelled visits to date: 1  No-shows to date: 0    For today's appointment patient:  [x]  Cancelled  []  Rescheduled appointment  []  No-show     Reason given by patient:  []  Patient ill  []  Conflicting appointment  []  No transportation    []  Conflict with work  []  No reason given  [x]  Other:     Comments:  Patient fell last week and would like to hold off on therapy for another week. Will resume as scheduled next week.     Electronically signed by:  Claudene Angles, PTA

## 2023-06-27 ENCOUNTER — HOSPITAL ENCOUNTER (OUTPATIENT)
Dept: PHYSICAL THERAPY | Age: 83
Setting detail: THERAPIES SERIES
Discharge: HOME | End: 2023-06-27
Payer: MEDICARE

## 2023-06-27 PROCEDURE — 97116 GAIT TRAINING THERAPY: CPT

## 2023-06-27 PROCEDURE — 97110 THERAPEUTIC EXERCISES: CPT

## 2023-06-29 ENCOUNTER — HOSPITAL ENCOUNTER (OUTPATIENT)
Dept: PHYSICAL THERAPY | Age: 83
Setting detail: THERAPIES SERIES
Discharge: HOME | End: 2023-06-29
Payer: MEDICARE

## 2023-06-29 PROCEDURE — 97112 NEUROMUSCULAR REEDUCATION: CPT

## 2023-06-29 PROCEDURE — 97110 THERAPEUTIC EXERCISES: CPT

## 2023-07-03 ENCOUNTER — HOSPITAL ENCOUNTER (OUTPATIENT)
Dept: PHYSICAL THERAPY | Age: 83
Setting detail: THERAPIES SERIES
Discharge: HOME | End: 2023-07-03
Payer: MEDICARE

## 2023-07-03 PROCEDURE — 97110 THERAPEUTIC EXERCISES: CPT

## 2023-07-03 PROCEDURE — 97530 THERAPEUTIC ACTIVITIES: CPT

## 2023-07-03 NOTE — FLOWSHEET NOTE
Lumbar Traction  [x] Neuromuscular Re-education    [] Cold/hotpack [] Iontophoresis   [] Instruction in HEP      [] Vasopneumatic   [] Dry Needling    [] Manual Therapy               [] Aquatic Therapy              Electronically signed by:  Paddy Mcintyre PTA  7/3/2023, 1:50 PM

## 2023-07-10 ENCOUNTER — HOSPITAL ENCOUNTER (OUTPATIENT)
Dept: PHYSICAL THERAPY | Age: 83
Setting detail: THERAPIES SERIES
Discharge: HOME | End: 2023-07-10
Payer: MEDICARE

## 2023-07-10 PROCEDURE — 97530 THERAPEUTIC ACTIVITIES: CPT

## 2023-07-10 PROCEDURE — 97110 THERAPEUTIC EXERCISES: CPT

## 2023-07-10 NOTE — FLOWSHEET NOTE
life  Patient agrees with established plan of care and assisted in the development of their  goals  Barriers to learning:none- no mental/cognitive barriers observed  Preferred learning style(s):   written- demonstration -practice  Preferred Language: English  Potential barriers to progress:none  The patient appears motivated to participate in PT : yes       Subjective:   patient denies pain upon arrival; have pain in back all the time but not right now. Any changes in Ambulatory Summary Sheet? None        Objective:  performed SLR on RLE in modified SAQ med roll into SLR (with RLE lag)           Exercises: (No more than 4 columns)   Exercise/Equipment Date  6/7/2023 6/29/2023 7/3/23 7/10/2023            WARM UP          Nustep  Seat 10/Arms 10 Lev 3 Seat 10/Arms 10 Lev 3 Seat 10/Arms 10 Lv3 10' Seat 10/Arms 10 Lv3 10'          TABLE       SLR 10x with A for right LE 10x with A for right LE 10x B wA on the right X10 LLE   X10 RLE with min A  X5 SAQ into RLE leg lift AROM   bridges 2x5  3x5 10x   LAQ 10x B 2x10x5\" B  2x10x5\" B 2x10x5\" B   Log rolling B LE 2x10 Left out in error --------- B LE 2x10 (mild Resistance)             STANDING       Side stepping  4 laps 4 laps 5 laps 5 laps   FM walk backs 7# 3 laps Hold  ----------    Heel ups 2x10 2x10 2x10  20x   Hurdles FWD   5 laps 3 laps; step to pattern                             PROPRIOCEPTION                                          MODALITIES                         Other Therapeutic Activities/Education:        Home Exercise Program:        Manual Treatments:        Modalities:        Communication with other providers:        Assessment:  (Response towards treatment session) (Pain Rating) Patient noted increased pain in his back after session and needed to sit in chair to rest before walking out of therapy.    Pain complaints after session 5/10       Plan for Next Session:        Time In / Time Out:    3705-7175         Timed Code/Total Treatment Minutes:

## 2023-07-13 ENCOUNTER — HOSPITAL ENCOUNTER (OUTPATIENT)
Dept: PHYSICAL THERAPY | Age: 83
Setting detail: THERAPIES SERIES
Discharge: HOME | End: 2023-07-13
Payer: MEDICARE

## 2023-07-13 PROCEDURE — 97112 NEUROMUSCULAR REEDUCATION: CPT

## 2023-07-13 PROCEDURE — 97110 THERAPEUTIC EXERCISES: CPT

## 2023-07-13 NOTE — FLOWSHEET NOTE
Outpatient Physical Therapy  [place]           [] Phone: [phone]   Fax: [phone]  [facility]ago[facility]           [x] Phone: [phone]   Fax: [phone]        Physical Therapy Daily Treatment Note  Date:  2023    Patient Name:  Sarthak Leger    :  1940  MRN: [id number]  Restrictions/Precautions: No data recorded   Position Activity Restriction  Other position/activity restrictions: none  Diagnosis:   No admission diagnoses are documented for this encounter. Diagnosis: Lumbar degen arthritis  Date of Injury/Surgery:   Treatment Diagnosis:  weakness, debility,unsteady gait  Insurance/Certification information: medicare  Referring Physician:  Claudell Garden, MD Adaline Sins   PCP: Claudell Garden, MD  Next Doctor Visit:    Plan of care signed (Y/N):    Outcome Measure:   Visit# / total visits:   7/10 then PN  Pain level: 4-5/10 LB pain  Goals:     Patient goals: want body to feel better  Long Term Goals  Time Frame for Long Term Goals: 6 weeks plan expires 23  I in home program.  patient will complete TUG in 23 seconds               Summary of Evaluation:  Assessment: TUG  28 sec  ASSESSMENT  Patient primary complaints: unsteady with waking/ lack of energy/back pain with ADL-IADL  History of condition:more intense LBP for past 2 years  Current functional limitations: limited IADL  Clinical findings:B LE weakness  PLOF:Pt was independent with ADLs/IADLs . Retired,   Patient's response to treatment: Pt tolerated  treatment without any adverse reactions or complications this date. . Pt would continue to benefit from skilled therapy interventions to address remaining impairments, improve mobility and strength,  and progress toward goal completion and prepare for d/c including finalizing HEP ;  while reducing risk for re-injury or further decline. Pain complaints after session 0/10   Skilled PT interventions are intended to:   establish HEP which will enable patient  to improve quality

## 2023-07-17 ENCOUNTER — HOSPITAL ENCOUNTER (OUTPATIENT)
Dept: PHYSICAL THERAPY | Age: 83
Setting detail: THERAPIES SERIES
Discharge: HOME | End: 2023-07-17
Payer: MEDICARE

## 2023-07-17 PROCEDURE — 97112 NEUROMUSCULAR REEDUCATION: CPT

## 2023-07-17 PROCEDURE — 97110 THERAPEUTIC EXERCISES: CPT

## 2023-07-17 NOTE — FLOWSHEET NOTE
Outpatient Physical Therapy  [place]           [] Phone: [phone]   Fax: [phone]  [facility]io Whittaker           [x] Phone: [phone]   Fax: [phone]        Physical Therapy Daily Treatment Note  Date:  2023    Patient Name:  Rosario Quintanilla    :  1940  MRN: [id number]  Restrictions/Precautions: No data recorded   Position Activity Restriction  Other position/activity restrictions: none  Diagnosis:   No admission diagnoses are documented for this encounter. Diagnosis: Lumbar degen arthritis  Date of Injury/Surgery:   Treatment Diagnosis:  weakness, debility,unsteady gait  Insurance/Certification information: medicare  Referring Physician:  MD Marielle Mathews   PCP: Reyes Vuong MD  Next Doctor Visit:    Plan of care signed (Y/N):    Outcome Measure:   Visit# / total visits:   8/10 then PN  Pain level: 4/10 LB pain  Goals:     Patient goals: want body to feel better  Long Term Goals  Time Frame for Long Term Goals: 6 weeks plan expires 23  I in home program.  patient will complete TUG in 23 seconds               Summary of Evaluation:  Assessment: TUG  28 sec  ASSESSMENT  Patient primary complaints: unsteady with waking/ lack of energy/back pain with ADL-IADL  History of condition:more intense LBP for past 2 years  Current functional limitations: limited IADL  Clinical findings:B LE weakness  PLOF:Pt was independent with ADLs/IADLs . Retired,   Patient's response to treatment: Pt tolerated  treatment without any adverse reactions or complications this date. . Pt would continue to benefit from skilled therapy interventions to address remaining impairments, improve mobility and strength,  and progress toward goal completion and prepare for d/c including finalizing HEP ;  while reducing risk for re-injury or further decline. Pain complaints after session 0/10   Skilled PT interventions are intended to:   establish HEP which will enable patient  to improve quality

## 2023-07-24 ENCOUNTER — HOSPITAL ENCOUNTER (OUTPATIENT)
Dept: PHYSICAL THERAPY | Age: 83
Setting detail: THERAPIES SERIES
Discharge: HOME | End: 2023-07-24
Payer: MEDICARE

## 2023-07-24 PROCEDURE — 97110 THERAPEUTIC EXERCISES: CPT

## 2023-07-24 NOTE — FLOWSHEET NOTE
Outpatient Physical Therapy  [place]           [] Phone: [phone]   Fax: [phone]  [facility]           [x] Phone: [phone]   Fax: [phone]        Physical Therapy Daily Treatment Note  Date:  2023    Patient Name:  Mikaela Holley    :  1940  MRN: [id number]  Restrictions/Precautions: No data recorded   Position Activity Restriction  Other position/activity restrictions: none  Diagnosis:   No admission diagnoses are documented for this encounter. Diagnosis: Lumbar degen arthritis  Date of Injury/Surgery:   Treatment Diagnosis:  weakness, debility,unsteady gait  Insurance/Certification information: medicare  Referring Physician:  Vivia Severs, MD Marye Cower   PCP: Vivia Severs, MD  Next Doctor Visit:    Plan of care signed (Y/N):    Outcome Measure:   Visit# / total visits:   8/10 then PN  Pain level: 4/10 LB pain  Goals:     Patient goals: want body to feel better  Long Term Goals  Time Frame for Long Term Goals: 6 weeks plan expires 23  I in home program.  patient will complete TUG in 23 seconds               Summary of Evaluation:  Assessment: TUG  28 sec  ASSESSMENT  Patient primary complaints: unsteady with waking/ lack of energy/back pain with ADL-IADL  History of condition:more intense LBP for past 2 years  Current functional limitations: limited IADL  Clinical findings:B LE weakness  PLOF:Pt was independent with ADLs/IADLs . Retired,   Patient's response to treatment: Pt tolerated  treatment without any adverse reactions or complications this date. . Pt would continue to benefit from skilled therapy interventions to address remaining impairments, improve mobility and strength,  and progress toward goal completion and prepare for d/c including finalizing HEP ;  while reducing risk for re-injury or further decline. Pain complaints after session 0/10   Skilled PT interventions are intended to:   establish HEP which will enable patient  to improve quality

## 2023-07-27 ENCOUNTER — HOSPITAL ENCOUNTER (OUTPATIENT)
Dept: PHYSICAL THERAPY | Age: 83
Setting detail: THERAPIES SERIES
Discharge: HOME | End: 2023-07-27
Payer: MEDICARE

## 2023-07-27 PROCEDURE — 97110 THERAPEUTIC EXERCISES: CPT

## 2023-07-27 PROCEDURE — 97530 THERAPEUTIC ACTIVITIES: CPT

## 2023-07-27 NOTE — FLOWSHEET NOTE
Outpatient Physical Therapy  [place]           [] Phone: [phone]   Fax: [phone]  [facility] [place]           [x] Phone: [phone]   Fax: [phone]        Physical Therapy Daily Treatment Note  Date:  2023    Patient Name:  Gonzalez Avila    :  1940  MRN: [id number]  Restrictions/Precautions: No data recorded   Position Activity Restriction  Other position/activity restrictions: none  Diagnosis:   No admission diagnoses are documented for this encounter. Diagnosis: Lumbar degen arthritis  Date of Injury/Surgery:   Treatment Diagnosis:  weakness, debility,unsteady gait  Insurance/Certification information: medicare  Referring Physician:  Pati Irizarry MD [facility]   PCP: Pati Irizarry MD  Next Doctor Visit:    Plan of care signed (Y/N):    Outcome Measure:   Visit# / total visits:  10/10 then PN  Pain level: 5/10 LB pain  Goals:     Patient goals: want body to feel better  Long Term Goals  Time Frame for Long Term Goals: 6 weeks plan expires 23  I in home program.  patient will complete TUG in 23 seconds               Summary of Evaluation:  Assessment: TUG  28 sec  ASSESSMENT  Patient primary complaints: unsteady with waking/ lack of energy/back pain with ADL-IADL  History of condition:more intense LBP for past 2 years  Current functional limitations: limited IADL  Clinical findings:B LE weakness  PLOF:Pt was independent with ADLs/IADLs . Retired,   Patient's response to treatment: Pt tolerated  treatment without any adverse reactions or complications this date. . Pt would continue to benefit from skilled therapy interventions to address remaining impairments, improve mobility and strength,  and progress toward goal completion and prepare for d/c including finalizing HEP ;  while reducing risk for re-injury or further decline. Pain complaints after session 0/10   Skilled PT interventions are intended to:   establish HEP which will enable patient  to improve quality

## 2023-07-27 NOTE — PROGRESS NOTES
Outpatient Physical Therapy           [place]           [] Phone: [phone]   Fax: [phone]  [facility]           [x] Phone: [phone]   Fax: [phone]      To: Ary Phillips MD     From: Kathy Waters PT,     Patient: Brigid Bowens                    : 1940  Diagnosis:  No admission diagnoses are documented for this encounter. Treatment Diagnosis:       Date: 2023  [x]  Progress Note                []  Discharge Note    Evaluation Date:  23   Total Visits to date:    Cancels/No-shows to date:    Subjective:    Plan of Care/Treatment to date:  [x] Therapeutic Exercise    [] Modalities:  [x] Therapeutic Activity     [] Ultrasound  [] Electrical Stimulation  [x] Gait Training      [] Cervical Traction   [] Lumbar Traction  [x] Neuromuscular Re-education  [] Cold/hotpack [] Iontophoresis  [x] Instruction in HEP      Other:  [x] Manual Therapy       []  Vasopneumatic  [] Aquatic Therapy       []   Dry Needle Therapy                    Objective/Significant Findings At Last Visit/Comments:  TUG 33sec    Assessment:   @ eval TUG 28 sec    Goal Status:  [] Achieved [] Partially Achieved  [x] Not Achieved   Patient goals: want body to feel better  Long Term Goals  Time Frame for Long Term Goals: 6 weeks plan expires 23  I in home program.  patient will complete TUG in 23 seconds    Rehab Potential: [] Excellent [x] Good [] Fair  [] Poor       Patient Status: [] Continue per initial plan of Care     [] Patient now discharged     [x] Additional visits requested, Please re-certify for additional visits:      Requested frequency/duration:  2/week for 4weeks  If we are requesting more visits, we fully anticipate the patient's condition is expected to improve within the treatment timeframe we are requesting. Electronically signed by:  Kathy Waters PT, , 2023, 3:17 PM    If you have any questions or concerns, please don't hesitate to call.   Thank you for your

## 2023-07-31 ENCOUNTER — HOSPITAL ENCOUNTER (OUTPATIENT)
Dept: PHYSICAL THERAPY | Age: 83
Setting detail: THERAPIES SERIES
Discharge: HOME | End: 2023-07-31
Payer: MEDICARE

## 2023-07-31 PROCEDURE — 97110 THERAPEUTIC EXERCISES: CPT

## 2023-07-31 PROCEDURE — 97112 NEUROMUSCULAR REEDUCATION: CPT

## 2023-07-31 NOTE — FLOWSHEET NOTE
Outpatient Physical Therapy  [place]           [] Phone: [phone]   Fax: [phone]  [facility] Sp[facility]           [x] Phone: [phone]   Fax: [phone]        Physical Therapy Daily Treatment Note  Date:  2023    Patient Name:  Archana Huang    :  1940  MRN: [id number]  Restrictions/Precautions: No data recorded   Position Activity Restriction  Other position/activity restrictions: none  Diagnosis:   No admission diagnoses are documented for this encounter. Diagnosis: Lumbar degen arthritis  Date of Injury/Surgery:   Treatment Diagnosis:  weakness, debility,unsteady gait  Insurance/Certification information: medicare  Referring Physician:  MD Jess Proctor   PCP: Alissa Wiseman MD  Next Doctor Visit:    Plan of care signed (Y/N):    Outcome Measure:   Visit# / total visits:   then PN  Pain level: 4/10 LB pain  Goals:     Patient goals: want body to feel better  Long Term Goals  Time Frame for Long Term Goals  I in home program.  patient will complete TUG in 23 seconds               Summary of Evaluation:  Assessment: TUG  28 sec  ASSESSMENT  Patient primary complaints: unsteady with waking/ lack of energy/back pain with ADL-IADL  History of condition:more intense LBP for past 2 years  Current functional limitations: limited IADL  Clinical findings:B LE weakness  PLOF:Pt was independent with ADLs/IADLs . Retired,   Patient's response to treatment: Pt tolerated  treatment without any adverse reactions or complications this date. . Pt would continue to benefit from skilled therapy interventions to address remaining impairments, improve mobility and strength,  and progress toward goal completion and prepare for d/c including finalizing HEP ;  while reducing risk for re-injury or further decline. Pain complaints after session 0/10   Skilled PT interventions are intended to:   establish HEP which will enable patient  to improve quality of life  Patient agrees with

## 2023-08-03 ENCOUNTER — HOSPITAL ENCOUNTER (OUTPATIENT)
Dept: PHYSICAL THERAPY | Age: 83
Setting detail: THERAPIES SERIES
Discharge: HOME | End: 2023-08-03
Payer: MEDICARE

## 2023-08-03 PROCEDURE — 97110 THERAPEUTIC EXERCISES: CPT

## 2023-08-03 PROCEDURE — 97112 NEUROMUSCULAR REEDUCATION: CPT

## 2023-08-03 NOTE — FLOWSHEET NOTE
Outpatient Physical Therapy  [place]           [] Phone: [phone]   Fax: [phone]  [facility]           [x] Phone: [phone]   Fax: [phone]        Physical Therapy Daily Treatment Note  Date:  8/3/2023    Patient Name:  Rafal Morse    :  1940  MRN: [id number]  Restrictions/Precautions: No data recorded   Position Activity Restriction  Other position/activity restrictions: none  Diagnosis:   No admission diagnoses are documented for this encounter. Diagnosis: Lumbar degen arthritis  Date of Injury/Surgery:   Treatment Diagnosis:  weakness, debility,unsteady gait  Insurance/Certification information: medicare  Referring Physician:  MD Shashank Diaz   PCP: Diego Lagunas MD  Next Doctor Visit:    Plan of care signed (Y/N):    Outcome Measure:   Visit# / total visits:   then PN  Pain level: 4/10 LB pain  Goals:     Patient goals: want body to feel better  Long Term Goals  Time Frame for Long Term Goals  I in home program.  patient will complete TUG in 23 seconds               Summary of Evaluation:  Assessment: TUG  28 sec  ASSESSMENT  Patient primary complaints: unsteady with waking/ lack of energy/back pain with ADL-IADL  History of condition:more intense LBP for past 2 years  Current functional limitations: limited IADL  Clinical findings:B LE weakness  PLOF:Pt was independent with ADLs/IADLs . Retired,   Patient's response to treatment: Pt tolerated  treatment without any adverse reactions or complications this date. . Pt would continue to benefit from skilled therapy interventions to address remaining impairments, improve mobility and strength,  and progress toward goal completion and prepare for d/c including finalizing HEP ;  while reducing risk for re-injury or further decline. Pain complaints after session 0/10   Skilled PT interventions are intended to:   establish HEP which will enable patient  to improve quality of life  Patient agrees with

## 2023-08-08 ENCOUNTER — HOSPITAL ENCOUNTER (OUTPATIENT)
Dept: PHYSICAL THERAPY | Age: 83
Setting detail: THERAPIES SERIES
Discharge: HOME | End: 2023-08-08
Payer: MEDICARE

## 2023-08-08 PROCEDURE — 97112 NEUROMUSCULAR REEDUCATION: CPT

## 2023-08-08 PROCEDURE — 97110 THERAPEUTIC EXERCISES: CPT

## 2023-08-08 PROCEDURE — 97530 THERAPEUTIC ACTIVITIES: CPT

## 2023-08-08 NOTE — FLOWSHEET NOTE
Outpatient Physical Therapy  [place]           [] Phone: [phone]   Fax: [phone]  [facility]           [x] Phone: [phone]   Fax: [phone]        Physical Therapy Daily Treatment Note  Date:  2023    Patient Name:  Roscoe Swan    :  1940  MRN: [id number]  Restrictions/Precautions: No data recorded   Position Activity Restriction  Other position/activity restrictions: none  Diagnosis:   No admission diagnoses are documented for this encounter. Diagnosis: Lumbar degen arthritis  Date of Injury/Surgery:   Treatment Diagnosis:  weakness, debility,unsteady gait  Insurance/Certification information: medicare  Referring Physician:  MD Adryan Robertson   PCP: Jose Walls MD  Next Doctor Visit:    Plan of care signed (Y/N):    Outcome Measure:   Visit# / total visits:   then PN  Pain level: 4/10 LB pain  Goals:     Patient goals: want body to feel better  Long Term Goals  Time Frame for Long Term Goals  I in home program.  patient will complete TUG in 23 seconds               Summary of Evaluation:  Assessment: TUG  28 sec  ASSESSMENT  Patient primary complaints: unsteady with waking/ lack of energy/back pain with ADL-IADL  History of condition:more intense LBP for past 2 years  Current functional limitations: limited IADL  Clinical findings:B LE weakness  PLOF:Pt was independent with ADLs/IADLs . Retired,   Patient's response to treatment: Pt tolerated  treatment without any adverse reactions or complications this date. . Pt would continue to benefit from skilled therapy interventions to address remaining impairments, improve mobility and strength,  and progress toward goal completion and prepare for d/c including finalizing HEP ;  while reducing risk for re-injury or further decline. Pain complaints after session 0/10   Skilled PT interventions are intended to:   establish HEP which will enable patient  to improve quality of life  Patient agrees with

## 2023-08-10 ENCOUNTER — HOSPITAL ENCOUNTER (OUTPATIENT)
Dept: PHYSICAL THERAPY | Age: 83
Setting detail: THERAPIES SERIES
Discharge: HOME | End: 2023-08-10
Payer: MEDICARE

## 2023-08-10 PROCEDURE — 97110 THERAPEUTIC EXERCISES: CPT

## 2023-08-10 PROCEDURE — 97530 THERAPEUTIC ACTIVITIES: CPT

## 2023-08-10 PROCEDURE — 97112 NEUROMUSCULAR REEDUCATION: CPT

## 2023-08-10 NOTE — FLOWSHEET NOTE
Outpatient Physical Therapy  [place]           [] Phone: [phone]   Fax: [phone]  [facility]           [x] Phone: [phone]   Fax: [phone]        Physical Therapy Daily Treatment Note  Date:  8/10/2023    Patient Name:  Elmer Basurto    :  1940  MRN: [id number]  Restrictions/Precautions: No data recorded   Position Activity Restriction  Other position/activity restrictions: none  Diagnosis:   No admission diagnoses are documented for this encounter. Diagnosis: Lumbar degen arthritis  Date of Injury/Surgery:   Treatment Diagnosis:  weakness, debility,unsteady gait  Insurance/Certification information: medicare  Referring Physician:  MD Mark Delgado   PCP: Alexsandra Adams MD  Next Doctor Visit:    Plan of care signed (Y/N):    Outcome Measure:   Visit# / total visits:   then PN  Pain level: 4/10 LB pain  Goals:     Patient goals: want body to feel better  Long Term Goals  Time Frame for Long Term Goals  I in home program.  patient will complete TUG in 23 seconds               Summary of Evaluation:  Assessment: TUG  28 sec  ASSESSMENT  Patient primary complaints: unsteady with waking/ lack of energy/back pain with ADL-IADL  History of condition:more intense LBP for past 2 years  Current functional limitations: limited IADL  Clinical findings:B LE weakness  PLOF:Pt was independent with ADLs/IADLs . Retired,   Patient's response to treatment: Pt tolerated  treatment without any adverse reactions or complications this date. . Pt would continue to benefit from skilled therapy interventions to address remaining impairments, improve mobility and strength,  and progress toward goal completion and prepare for d/c including finalizing HEP ;  while reducing risk for re-injury or further decline. Pain complaints after session 0/10   Skilled PT interventions are intended to:   establish HEP which will enable patient  to improve quality of life  Patient agrees with - - -

## 2023-08-15 ENCOUNTER — HOSPITAL ENCOUNTER (OUTPATIENT)
Dept: PHYSICAL THERAPY | Age: 83
Setting detail: THERAPIES SERIES
Discharge: HOME | End: 2023-08-15
Payer: MEDICARE

## 2023-08-15 PROCEDURE — 97530 THERAPEUTIC ACTIVITIES: CPT

## 2023-08-15 PROCEDURE — 97110 THERAPEUTIC EXERCISES: CPT

## 2023-08-15 PROCEDURE — 97112 NEUROMUSCULAR REEDUCATION: CPT

## 2023-08-15 NOTE — FLOWSHEET NOTE
Outpatient Physical Therapy  [place]           [] Phone: [phone]   Fax: [phone]  [facility]           [x] Phone: [phone]   Fax: [phone]        Physical Therapy Daily Treatment Note  Date:  8/15/2023    Patient Name:  Roly Quevedo    :  1940  MRN: [id number]  Restrictions/Precautions: No data recorded   Position Activity Restriction  Other position/activity restrictions: none  Diagnosis:   No admission diagnoses are documented for this encounter. Diagnosis: Lumbar degen arthritis  Date of Injury/Surgery:   Treatment Diagnosis:  weakness, debility,unsteady gait  Insurance/Certification information: medicare  Referring Physician:  MD Arcadio Cardenas   PCP: Aishwarya Dempsey MD  Next Doctor Visit:    Plan of care signed (Y/N):    Outcome Measure:   Visit# / total visits:   then PN  Pain level: 4/10 LB pain                      15' late  Goals:     Patient goals: want body to feel better  Long Term Goals  Time Frame for Long Term Goals  I in home program.  patient will complete TUG in 23 seconds               Summary of Evaluation:  Assessment: TUG  28 sec  ASSESSMENT  Patient primary complaints: unsteady with waking/ lack of energy/back pain with ADL-IADL  History of condition:more intense LBP for past 2 years  Current functional limitations: limited IADL  Clinical findings:B LE weakness  PLOF:Pt was independent with ADLs/IADLs . Retired,   Patient's response to treatment: Pt tolerated  treatment without any adverse reactions or complications this date. . Pt would continue to benefit from skilled therapy interventions to address remaining impairments, improve mobility and strength,  and progress toward goal completion and prepare for d/c including finalizing HEP ;  while reducing risk for re-injury or further decline. Pain complaints after session 0/10   Skilled PT interventions are intended to:   establish HEP which will enable patient  to improve quality

## 2023-08-17 ENCOUNTER — HOSPITAL ENCOUNTER (OUTPATIENT)
Dept: PHYSICAL THERAPY | Age: 83
Setting detail: THERAPIES SERIES
Discharge: HOME | End: 2023-08-17
Payer: MEDICARE

## 2023-08-17 PROCEDURE — 97110 THERAPEUTIC EXERCISES: CPT

## 2023-08-17 PROCEDURE — 97530 THERAPEUTIC ACTIVITIES: CPT

## 2023-08-17 PROCEDURE — 97112 NEUROMUSCULAR REEDUCATION: CPT

## 2023-08-17 NOTE — FLOWSHEET NOTE
Neuromuscular Re-education    [] Cold/hotpack [] Iontophoresis   [] Instruction in HEP      [] Vasopneumatic   [] Dry Needling    [] Manual Therapy               [] Aquatic Therapy              Electronically signed by:  Jayson Hill PT, PTA 8/17/2023, 2:01 PM

## 2023-08-22 ENCOUNTER — HOSPITAL ENCOUNTER (OUTPATIENT)
Dept: PHYSICAL THERAPY | Age: 83
Setting detail: THERAPIES SERIES
Discharge: HOME | End: 2023-08-22
Payer: MEDICARE

## 2023-08-22 PROCEDURE — 97530 THERAPEUTIC ACTIVITIES: CPT

## 2023-08-22 PROCEDURE — 97110 THERAPEUTIC EXERCISES: CPT

## 2023-08-22 PROCEDURE — 97112 NEUROMUSCULAR REEDUCATION: CPT

## 2023-08-22 NOTE — FLOWSHEET NOTE
Cold/hotpack [] Iontophoresis   [] Instruction in HEP      [] Vasopneumatic   [] Dry Needling    [] Manual Therapy               [] Aquatic Therapy              Electronically signed by:  Yrn Sharma PTA 8/22/2023, 1:49 PM

## 2023-08-24 ENCOUNTER — HOSPITAL ENCOUNTER (OUTPATIENT)
Dept: PHYSICAL THERAPY | Age: 83
Setting detail: THERAPIES SERIES
Discharge: HOME | End: 2023-08-24
Payer: MEDICARE

## 2023-08-24 PROCEDURE — 97112 NEUROMUSCULAR REEDUCATION: CPT

## 2023-08-24 PROCEDURE — 97110 THERAPEUTIC EXERCISES: CPT

## 2023-08-24 NOTE — FLOWSHEET NOTE
Therapy               [] Aquatic Therapy              Electronically signed by:  Venessa Garcia, SIMONE 8/24/2023, 1:56 PM

## 2023-08-29 ENCOUNTER — HOSPITAL ENCOUNTER (OUTPATIENT)
Dept: PHYSICAL THERAPY | Age: 83
Setting detail: THERAPIES SERIES
Discharge: HOME | End: 2023-08-29
Payer: MEDICARE

## 2023-08-29 PROCEDURE — 97110 THERAPEUTIC EXERCISES: CPT

## 2023-08-29 PROCEDURE — 97112 NEUROMUSCULAR REEDUCATION: CPT

## 2023-08-29 NOTE — FLOWSHEET NOTE
Outpatient Physical Therapy  [place]           [] Phone: [phone]   Fax: [phone]  [place]           [x] Phone: [phone]   Fax: [phone]        Physical Therapy Daily Treatment Note  Date:  2023    Patient Name:  Mariam Quezada    :  1940  MRN: [id number]  Restrictions/Precautions: No data recorded   Position Activity Restriction  Other position/activity restrictions: none  Diagnosis:   No admission diagnoses are documented for this encounter. Diagnosis: Lumbar degen arthritis  Date of Injury/Surgery:   Treatment Diagnosis:  weakness, debility,unsteady gait  Insurance/Certification information: medicare  Referring Physician:  MD Nacho Jessica   PCP: Erica Crane MD  Next Doctor Visit:    Plan of care signed (Y/N):    Outcome Measure:   Visit# / total visits:   then PN  Pain level: 4/10 LB pain                          Goals:     Patient goals: want body to feel better  Long Term Goals  Time Frame for Long Term Goals  I in home program.  patient will complete TUG in 23 seconds               Summary of Evaluation:  Assessment: TUG  28 sec  ASSESSMENT  Patient primary complaints: unsteady with waking/ lack of energy/back pain with ADL-IADL  History of condition:more intense LBP for past 2 years  Current functional limitations: limited IADL  Clinical findings:B LE weakness  PLOF:Pt was independent with ADLs/IADLs . Retired,   Patient's response to treatment: Pt tolerated  treatment without any adverse reactions or complications this date. . Pt would continue to benefit from skilled therapy interventions to address remaining impairments, improve mobility and strength,  and progress toward goal completion and prepare for d/c including finalizing HEP ;  while reducing risk for re-injury or further decline. Pain complaints after session 0/10   Skilled PT interventions are intended to:   establish HEP which will enable patient  to improve quality of

## 2024-01-11 ENCOUNTER — HOSPITAL ENCOUNTER (OUTPATIENT)
Age: 84
Discharge: HOME | End: 2024-01-11
Payer: MEDICARE

## 2024-01-11 LAB
ALBUMIN SERPL-MCNC: 3.7 GM/DL (ref 3.4–5)
ALP BLD-CCNC: 72 IU/L (ref 40–129)
ALT SERPL-CCNC: 14 U/L (ref 10–40)
ANION GAP SERPL CALCULATED.3IONS-SCNC: 9 MMOL/L (ref 7–16)
AST SERPL-CCNC: 19 IU/L (ref 8–33)
BASOPHILS ABSOLUTE: 0.1 K/CU MM
BASOPHILS RELATIVE PERCENT: 1 % (ref 0–1)
BILIRUB SERPL-MCNC: 0.4 MG/DL (ref 0–1)
BUN SERPL-MCNC: 24 MG/DL (ref 6–23)
CALCIUM SERPL-MCNC: 9.4 MG/DL (ref 8.3–10.6)
CHLORIDE BLD-SCNC: 106 MMOL/L (ref 99–110)
CO2: 29 MMOL/L (ref 21–32)
CREAT SERPL-MCNC: 1 MG/DL (ref 0.9–1.3)
DIFFERENTIAL TYPE: ABNORMAL
EOSINOPHILS ABSOLUTE: 0.2 K/CU MM
EOSINOPHILS RELATIVE PERCENT: 3.9 % (ref 0–3)
GFR SERPL CREATININE-BSD FRML MDRD: >60 ML/MIN/1.73M2
GLUCOSE SERPL-MCNC: 110 MG/DL (ref 70–99)
HCT VFR BLD CALC: 42.4 % (ref 42–52)
HEMOGLOBIN: 13.5 GM/DL (ref 13.5–18)
IMMATURE NEUTROPHIL %: 0.3 % (ref 0–0.43)
LYMPHOCYTES ABSOLUTE: 1.5 K/CU MM
LYMPHOCYTES RELATIVE PERCENT: 23.3 % (ref 24–44)
MCH RBC QN AUTO: 32 PG (ref 27–31)
MCHC RBC AUTO-ENTMCNC: 31.8 % (ref 32–36)
MCV RBC AUTO: 100.5 FL (ref 78–100)
MONOCYTES ABSOLUTE: 0.7 K/CU MM
MONOCYTES RELATIVE PERCENT: 11.7 % (ref 0–4)
PDW BLD-RTO: 12.4 % (ref 11.7–14.9)
PLATELET # BLD: 196 K/CU MM (ref 140–440)
PMV BLD AUTO: 9.7 FL (ref 7.5–11.1)
POTASSIUM SERPL-SCNC: 4.3 MMOL/L (ref 3.5–5.1)
RBC # BLD: 4.22 M/CU MM (ref 4.6–6.2)
SEGMENTED NEUTROPHILS ABSOLUTE COUNT: 3.7 K/CU MM
SEGMENTED NEUTROPHILS RELATIVE PERCENT: 59.8 % (ref 36–66)
SODIUM BLD-SCNC: 144 MMOL/L (ref 135–145)
TOTAL IMMATURE NEUTOROPHIL: 0.02 K/CU MM
TOTAL PROTEIN: 6.2 GM/DL (ref 6.4–8.2)
WBC # BLD: 6.2 K/CU MM (ref 4–10.5)

## 2024-01-11 PROCEDURE — 36415 COLL VENOUS BLD VENIPUNCTURE: CPT

## 2024-01-11 PROCEDURE — 85025 COMPLETE CBC W/AUTO DIFF WBC: CPT

## 2024-01-11 PROCEDURE — 80053 COMPREHEN METABOLIC PANEL: CPT

## 2025-02-03 ENCOUNTER — HOSPITAL ENCOUNTER (INPATIENT)
Age: 85
LOS: 3 days | Discharge: HOME HEALTH | End: 2025-02-06
Attending: EMERGENCY MEDICINE | Admitting: INTERNAL MEDICINE
Payer: MEDICARE

## 2025-02-03 ENCOUNTER — APPOINTMENT (OUTPATIENT)
Dept: GENERAL RADIOLOGY | Age: 85
End: 2025-02-03
Attending: EMERGENCY MEDICINE
Payer: MEDICARE

## 2025-02-03 DIAGNOSIS — J10.1 INFLUENZA A: Primary | ICD-10-CM

## 2025-02-03 DIAGNOSIS — R09.02 HYPOXEMIA: ICD-10-CM

## 2025-02-03 DIAGNOSIS — R53.1 GENERALIZED WEAKNESS: ICD-10-CM

## 2025-02-03 PROBLEM — J18.9 PNEUMONIA, UNSPECIFIED ORGANISM: Status: ACTIVE | Noted: 2025-02-03

## 2025-02-03 LAB
ANION GAP SERPL CALCULATED.3IONS-SCNC: 12 MMOL/L (ref 4–16)
BASOPHILS # BLD: 0.03 K/UL
BASOPHILS NFR BLD: 1 % (ref 0–1)
BUN SERPL-MCNC: 24 MG/DL (ref 6–23)
CALCIUM SERPL-MCNC: 8.6 MG/DL (ref 8.3–10.6)
CHLORIDE SERPL-SCNC: 106 MMOL/L (ref 99–110)
CO2 SERPL-SCNC: 25 MMOL/L (ref 21–32)
CREAT SERPL-MCNC: 1.1 MG/DL (ref 0.9–1.3)
EKG ATRIAL RATE: 77 BPM
EKG DIAGNOSIS: NORMAL
EKG P AXIS: 53 DEGREES
EKG P-R INTERVAL: 128 MS
EKG Q-T INTERVAL: 368 MS
EKG QRS DURATION: 62 MS
EKG QTC CALCULATION (BAZETT): 416 MS
EKG R AXIS: 50 DEGREES
EKG T AXIS: 40 DEGREES
EKG VENTRICULAR RATE: 77 BPM
EOSINOPHIL # BLD: 0.03 K/UL
EOSINOPHILS RELATIVE PERCENT: 1 % (ref 0–3)
ERYTHROCYTE [DISTWIDTH] IN BLOOD BY AUTOMATED COUNT: 13 % (ref 11.7–14.9)
GFR, ESTIMATED: 65 ML/MIN/1.73M2
GLUCOSE BLD-MCNC: 85 MG/DL
GLUCOSE BLD-MCNC: 85 MG/DL (ref 74–99)
GLUCOSE SERPL-MCNC: 83 MG/DL (ref 74–99)
HCT VFR BLD AUTO: 39.8 % (ref 42–52)
HGB BLD-MCNC: 12.9 G/DL (ref 13.5–18)
IMM GRANULOCYTES # BLD AUTO: 0.02 K/UL
IMM GRANULOCYTES NFR BLD: 0 %
INFLUENZA A BY PCR: DETECTED
INFLUENZA B BY PCR: NOT DETECTED
LYMPHOCYTES NFR BLD: 0.57 K/UL
LYMPHOCYTES RELATIVE PERCENT: 10 % (ref 24–44)
MCH RBC QN AUTO: 32.2 PG (ref 27–31)
MCHC RBC AUTO-ENTMCNC: 32.4 G/DL (ref 32–36)
MCV RBC AUTO: 99.3 FL (ref 78–100)
MONOCYTES NFR BLD: 0.63 K/UL
MONOCYTES NFR BLD: 11 % (ref 0–4)
NEUTROPHILS NFR BLD: 79 % (ref 36–66)
NEUTS SEG NFR BLD: 4.68 K/UL
PLATELET # BLD AUTO: 110 K/UL (ref 140–440)
PLATELET CONFIRMATION: NORMAL
PMV BLD AUTO: 10.6 FL (ref 7.5–11.1)
POTASSIUM SERPL-SCNC: 4.3 MMOL/L (ref 3.5–5.1)
RBC # BLD AUTO: 4.01 M/UL (ref 4.6–6.2)
SARS-COV-2 RDRP RESP QL NAA+PROBE: NOT DETECTED
SODIUM SERPL-SCNC: 143 MMOL/L (ref 135–145)
SPECIMEN DESCRIPTION: NORMAL
WBC OTHER # BLD: 6 K/UL (ref 4–10.5)

## 2025-02-03 PROCEDURE — 99285 EMERGENCY DEPT VISIT HI MDM: CPT

## 2025-02-03 PROCEDURE — 6360000002 HC RX W HCPCS: Performed by: NURSE PRACTITIONER

## 2025-02-03 PROCEDURE — 80048 BASIC METABOLIC PNL TOTAL CA: CPT

## 2025-02-03 PROCEDURE — 71045 X-RAY EXAM CHEST 1 VIEW: CPT

## 2025-02-03 PROCEDURE — 94640 AIRWAY INHALATION TREATMENT: CPT

## 2025-02-03 PROCEDURE — 2500000003 HC RX 250 WO HCPCS: Performed by: NURSE PRACTITIONER

## 2025-02-03 PROCEDURE — 1200000000 HC SEMI PRIVATE

## 2025-02-03 PROCEDURE — 6370000000 HC RX 637 (ALT 250 FOR IP): Performed by: EMERGENCY MEDICINE

## 2025-02-03 PROCEDURE — 6370000000 HC RX 637 (ALT 250 FOR IP): Performed by: NURSE PRACTITIONER

## 2025-02-03 PROCEDURE — 93005 ELECTROCARDIOGRAM TRACING: CPT | Performed by: EMERGENCY MEDICINE

## 2025-02-03 PROCEDURE — 82962 GLUCOSE BLOOD TEST: CPT

## 2025-02-03 PROCEDURE — 87635 SARS-COV-2 COVID-19 AMP PRB: CPT

## 2025-02-03 PROCEDURE — 93010 ELECTROCARDIOGRAM REPORT: CPT | Performed by: INTERNAL MEDICINE

## 2025-02-03 PROCEDURE — 85025 COMPLETE CBC W/AUTO DIFF WBC: CPT

## 2025-02-03 PROCEDURE — 87502 INFLUENZA DNA AMP PROBE: CPT

## 2025-02-03 RX ORDER — DOXYCYCLINE HYCLATE 100 MG
100 TABLET ORAL EVERY 12 HOURS SCHEDULED
Status: DISCONTINUED | OUTPATIENT
Start: 2025-02-03 | End: 2025-02-06 | Stop reason: HOSPADM

## 2025-02-03 RX ORDER — FAMOTIDINE 20 MG/1
20 TABLET, FILM COATED ORAL DAILY
Status: DISCONTINUED | OUTPATIENT
Start: 2025-02-04 | End: 2025-02-06 | Stop reason: HOSPADM

## 2025-02-03 RX ORDER — OSELTAMIVIR PHOSPHATE 30 MG/1
30 CAPSULE ORAL 2 TIMES DAILY
Status: DISCONTINUED | OUTPATIENT
Start: 2025-02-04 | End: 2025-02-06 | Stop reason: HOSPADM

## 2025-02-03 RX ORDER — MONTELUKAST SODIUM 10 MG/1
10 TABLET ORAL NIGHTLY
Status: DISCONTINUED | OUTPATIENT
Start: 2025-02-03 | End: 2025-02-06 | Stop reason: HOSPADM

## 2025-02-03 RX ORDER — SODIUM CHLORIDE 0.9 % (FLUSH) 0.9 %
5-40 SYRINGE (ML) INJECTION EVERY 12 HOURS SCHEDULED
Status: DISCONTINUED | OUTPATIENT
Start: 2025-02-03 | End: 2025-02-06 | Stop reason: HOSPADM

## 2025-02-03 RX ORDER — ASCORBIC ACID 500 MG
1000 TABLET ORAL DAILY
Status: DISCONTINUED | OUTPATIENT
Start: 2025-02-04 | End: 2025-02-06 | Stop reason: HOSPADM

## 2025-02-03 RX ORDER — SODIUM CHLORIDE 0.9 % (FLUSH) 0.9 %
5-40 SYRINGE (ML) INJECTION PRN
Status: DISCONTINUED | OUTPATIENT
Start: 2025-02-03 | End: 2025-02-06 | Stop reason: HOSPADM

## 2025-02-03 RX ORDER — ONDANSETRON 2 MG/ML
4 INJECTION INTRAMUSCULAR; INTRAVENOUS EVERY 6 HOURS PRN
Status: DISCONTINUED | OUTPATIENT
Start: 2025-02-03 | End: 2025-02-06 | Stop reason: HOSPADM

## 2025-02-03 RX ORDER — ONDANSETRON 4 MG/1
4 TABLET, ORALLY DISINTEGRATING ORAL EVERY 8 HOURS PRN
Status: DISCONTINUED | OUTPATIENT
Start: 2025-02-03 | End: 2025-02-06 | Stop reason: HOSPADM

## 2025-02-03 RX ORDER — FLUTICASONE PROPIONATE 50 MCG
2 SPRAY, SUSPENSION (ML) NASAL DAILY PRN
Status: DISCONTINUED | OUTPATIENT
Start: 2025-02-03 | End: 2025-02-06 | Stop reason: HOSPADM

## 2025-02-03 RX ORDER — POLYETHYLENE GLYCOL 3350 17 G/17G
17 POWDER, FOR SOLUTION ORAL DAILY PRN
Status: DISCONTINUED | OUTPATIENT
Start: 2025-02-03 | End: 2025-02-06 | Stop reason: HOSPADM

## 2025-02-03 RX ORDER — BENZONATATE 100 MG/1
100 CAPSULE ORAL 3 TIMES DAILY PRN
Status: DISCONTINUED | OUTPATIENT
Start: 2025-02-03 | End: 2025-02-06 | Stop reason: HOSPADM

## 2025-02-03 RX ORDER — LISINOPRIL 10 MG/1
10 TABLET ORAL DAILY
Status: DISCONTINUED | OUTPATIENT
Start: 2025-02-04 | End: 2025-02-06 | Stop reason: HOSPADM

## 2025-02-03 RX ORDER — IPRATROPIUM BROMIDE AND ALBUTEROL SULFATE 2.5; .5 MG/3ML; MG/3ML
1 SOLUTION RESPIRATORY (INHALATION)
Status: DISCONTINUED | OUTPATIENT
Start: 2025-02-03 | End: 2025-02-06 | Stop reason: HOSPADM

## 2025-02-03 RX ORDER — ENOXAPARIN SODIUM 100 MG/ML
40 INJECTION SUBCUTANEOUS DAILY
Status: DISCONTINUED | OUTPATIENT
Start: 2025-02-03 | End: 2025-02-06 | Stop reason: HOSPADM

## 2025-02-03 RX ORDER — OSELTAMIVIR PHOSPHATE 75 MG/1
75 CAPSULE ORAL 2 TIMES DAILY
Status: DISCONTINUED | OUTPATIENT
Start: 2025-02-03 | End: 2025-02-03

## 2025-02-03 RX ORDER — AMLODIPINE AND BENAZEPRIL HYDROCHLORIDE 5; 10 MG/1; MG/1
1 CAPSULE ORAL DAILY
Status: DISCONTINUED | OUTPATIENT
Start: 2025-02-03 | End: 2025-02-03

## 2025-02-03 RX ORDER — VITAMIN B COMPLEX
2000 TABLET ORAL DAILY
Status: DISCONTINUED | OUTPATIENT
Start: 2025-02-04 | End: 2025-02-06 | Stop reason: HOSPADM

## 2025-02-03 RX ORDER — RIVASTIGMINE TARTRATE 4.5 MG/1
4.5 CAPSULE ORAL 2 TIMES DAILY
COMMUNITY

## 2025-02-03 RX ORDER — ACETAMINOPHEN 650 MG/1
650 SUPPOSITORY RECTAL EVERY 6 HOURS PRN
Status: DISCONTINUED | OUTPATIENT
Start: 2025-02-03 | End: 2025-02-06 | Stop reason: HOSPADM

## 2025-02-03 RX ORDER — TAMSULOSIN HYDROCHLORIDE 0.4 MG/1
0.4 CAPSULE ORAL DAILY
Status: DISCONTINUED | OUTPATIENT
Start: 2025-02-04 | End: 2025-02-05

## 2025-02-03 RX ORDER — TRAZODONE HYDROCHLORIDE 50 MG/1
50 TABLET, FILM COATED ORAL NIGHTLY
Status: DISCONTINUED | OUTPATIENT
Start: 2025-02-03 | End: 2025-02-06 | Stop reason: HOSPADM

## 2025-02-03 RX ORDER — RIVASTIGMINE TARTRATE 1.5 MG/1
4.5 CAPSULE ORAL 2 TIMES DAILY
Status: DISCONTINUED | OUTPATIENT
Start: 2025-02-03 | End: 2025-02-05

## 2025-02-03 RX ORDER — SODIUM CHLORIDE 9 MG/ML
250 INJECTION, SOLUTION INTRAVENOUS PRN
Status: DISCONTINUED | OUTPATIENT
Start: 2025-02-03 | End: 2025-02-06 | Stop reason: HOSPADM

## 2025-02-03 RX ORDER — TRAZODONE HYDROCHLORIDE 50 MG/1
50 TABLET, FILM COATED ORAL NIGHTLY
COMMUNITY

## 2025-02-03 RX ORDER — GUAIFENESIN 600 MG/1
600 TABLET, EXTENDED RELEASE ORAL 2 TIMES DAILY
Status: DISCONTINUED | OUTPATIENT
Start: 2025-02-03 | End: 2025-02-06 | Stop reason: HOSPADM

## 2025-02-03 RX ORDER — ACETAMINOPHEN 325 MG/1
650 TABLET ORAL EVERY 6 HOURS PRN
Status: DISCONTINUED | OUTPATIENT
Start: 2025-02-03 | End: 2025-02-06 | Stop reason: HOSPADM

## 2025-02-03 RX ORDER — AMLODIPINE BESYLATE 5 MG/1
5 TABLET ORAL DAILY
Status: DISCONTINUED | OUTPATIENT
Start: 2025-02-04 | End: 2025-02-06 | Stop reason: HOSPADM

## 2025-02-03 RX ADMIN — GUAIFENESIN 600 MG: 600 TABLET, EXTENDED RELEASE ORAL at 16:13

## 2025-02-03 RX ADMIN — MONTELUKAST 10 MG: 10 TABLET, FILM COATED ORAL at 20:55

## 2025-02-03 RX ADMIN — DOXYCYCLINE HYCLATE 100 MG: 100 TABLET, COATED ORAL at 16:13

## 2025-02-03 RX ADMIN — OSELTAMIVIR 75 MG: 75 CAPSULE ORAL at 14:00

## 2025-02-03 RX ADMIN — IPRATROPIUM BROMIDE AND ALBUTEROL SULFATE 1 DOSE: 2.5; .5 SOLUTION RESPIRATORY (INHALATION) at 15:51

## 2025-02-03 RX ADMIN — BENZONATATE 100 MG: 100 CAPSULE ORAL at 20:55

## 2025-02-03 RX ADMIN — SODIUM CHLORIDE, PRESERVATIVE FREE 10 ML: 5 INJECTION INTRAVENOUS at 20:56

## 2025-02-03 RX ADMIN — GUAIFENESIN 600 MG: 600 TABLET, EXTENDED RELEASE ORAL at 20:55

## 2025-02-03 RX ADMIN — ACETAMINOPHEN 650 MG: 325 TABLET ORAL at 20:55

## 2025-02-03 RX ADMIN — WATER 1000 MG: 1 INJECTION INTRAMUSCULAR; INTRAVENOUS; SUBCUTANEOUS at 16:12

## 2025-02-03 RX ADMIN — RIVASTIGMINE TARTRATE 4.5 MG: 1.5 CAPSULE ORAL at 20:55

## 2025-02-03 RX ADMIN — IPRATROPIUM BROMIDE AND ALBUTEROL SULFATE 1 DOSE: 2.5; .5 SOLUTION RESPIRATORY (INHALATION) at 19:59

## 2025-02-03 RX ADMIN — TRAZODONE HYDROCHLORIDE 50 MG: 50 TABLET ORAL at 20:55

## 2025-02-03 ASSESSMENT — ENCOUNTER SYMPTOMS: DIARRHEA: 1

## 2025-02-03 ASSESSMENT — PAIN - FUNCTIONAL ASSESSMENT: PAIN_FUNCTIONAL_ASSESSMENT: NONE - DENIES PAIN

## 2025-02-03 NOTE — ED PROVIDER NOTES
Triage Chief Complaint:   Fatigue (C/o generalized weakness and diarrhea this am)    [place]:  Man Harris is a 84 y.o. male that presents to the ED by EMS.  Concerned that an episode of diarrhea family is not yet arrived the patient is some underlying dementia for me he is awake alert he thinks is 2024 I thought that he was at the [facility] externally he appears well no exanthem vital signs are stable afebrile and not 97% on room air.  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 No pertinent family history.  Social History     Socioeconomic History    Marital status:      Spouse name: Not on file    Number of children: Not on file    Years of education: Not on file    Highest education level: Not on file   Occupational History    Not on file   Tobacco Use    Smoking status: Never    Smokeless tobacco: Never   Vaping Use    Vaping status: Never Used   Substance and Sexual Activity    Alcohol use: No    Drug use: Never    Sexual activity: Not Currently   Other Topics Concern    Not on file   Social History Narrative    Not on file     Social Determinants of Health     Financial Resource Strain: Not on file   Food Insecurity: Not on file   Transportation Needs: Not on file   Physical Activity: Not on file   Stress: Not on file   Social Connections: Not on file   Intimate Partner Violence: Not on file   Housing Stability: Not on file     Current Facility-Administered Medications   Medication Dose Route Frequency Provider Last Rate Last Admin    oseltamivir (TAMIFLU) capsule 75 mg  75 mg      I am the Primary Clinician of Record.           Clinical Impression:  1. Influenza A    2. Hypoxemia    3. Generalized weakness      Disposition referral (if applicable):  No follow-up provider specified.  Disposition medications (if applicable):  New Prescriptions    No medications on file           Luis Deleon DO, FACEBRYN      Comment: Please note this report has been produced using speech recognition software and maycontain errors related to that system including errors in grammar, punctuation, and spelling, as well as words and phrases that may be inappropriate. If there are any questions or concerns please feel free to contact thedictating provider for clarification.        Luis Deleon DO  02/03/25 3619

## 2025-02-03 NOTE — PROGRESS NOTES
Pharmacy Note - Renal dose adjustment made per P/T protocol    Original order:  Tamiflu 75mg BID    Estimated Creatinine Clearance: 42 mL/min (based on SCr of 1.1 mg/dL).    Recent Labs     02/03/25  1310   BUN 24*   CREATININE 1.1       Renally adjusted order:  Tamiflu 30mg BID    Please call pharmacy with any questions.    Thank you,  Veto Mast [facility]  2/3/2025 6:15 PM

## 2025-02-03 NOTE — H&P
monitoring was CBC        History from:     spouse    History of Present Illness:     Chief Complaint:   Man Harris is a 84 y.o. male with pmh of listed below who presents with fatigue and generalized weakness.  Had diarrhea this a.m.  Patient seen and examined in the emergency department family is at bedside.  Patient was was brought to the emergency department after a single episode of diarrhea patient does have some underlying dementia.  However he is awake and alert.  He thought he was at [facility].  Wife is present states he is just not acting like his self he has generalized weakness she states he was satting only 88% at home while in the ER he is requiring 2 L nasal cannula.  Chest x-ray completed in the emergency department shows patchy alveolar opacities seen in the lungs.  Atelectasis or pneumonia would be a consideration in the correct clinical setting.  Patient has been started empirically on IV antibiotics.  He is admitted for further evaluation and treatment.  Review of Systems:        Pertinent positives and negatives discussed in HPI     Objective:   No intake or output data in the 24 hours ending 02/03/25 1424   Vitals:   Vitals:    02/03/25 1300 02/03/25 1315 02/03/25 1330 02/03/25 1345   BP: (!) 143/68 132/68 127/64 (!) 141/66   Pulse:       Resp:       TempSrc:       SpO2: 93% 95% 90% 92%       Personally Reviewed Medications Prior to Admission     Prior to Admission medications    Medication Sig Start Date End Date Taking? Authorizing Provider   CPAP Machine MISC by CPAP route nightly Indications: auto pap 4-20    ProviderBenjamin MD   tamsulosin (FLOMAX) 0.4 MG capsule Take 1 capsule by mouth daily    ProviderBenjamin MD   famotidine (PEPCID) 20 MG tablet Take 1 tablet by mouth 2 times daily for 7 days. 4/17/13 4/24/13  DwightPetr kamara MD   folic acid (FOLVITE) 1 MG tablet Take 1 tablet by mouth daily    ProviderBenjamin MD   amLODIPine-benazepril (LOTREL) 5-10    • Highest education level: None   Tobacco Use   • Smoking status: Never   • Smokeless tobacco: Never   Vaping Use   • Vaping status: Never Used   Substance and Sexual Activity   • Alcohol use: No   • Drug use: Never   • Sexual activity: Not Currently       Medications:   Medications:   • oseltamivir  75 mg Oral BID   • sodium chloride flush  5-40 mL IntraVENous 2 times per day   • enoxaparin  40 mg SubCUTAneous Daily   • ipratropium 0.5 mg-albuterol 2.5 mg  1 Dose Inhalation Q4H WA RT   • guaiFENesin  600 mg Oral BID   • cefTRIAXone (ROCEPHIN) IV  1,000 mg IntraVENous Q24H    And   • doxycycline  100 mg Oral 2 times per day      Infusions:   • sodium chloride       PRN Meds: sodium chloride flush, 5-40 mL, PRN  sodium chloride, , PRN  ondansetron, 4 mg, Q8H PRN   Or  ondansetron, 4 mg, Q6H PRN  polyethylene glycol, 17 g, Daily PRN  acetaminophen, 650 mg, Q6H PRN   Or  acetaminophen, 650 mg, Q6H PRN  benzonatate, 100 mg, TID PRN        Labs      CBC:   Recent Labs     02/03/25  1310   WBC 6.0   HGB 12.9*   *     BMP:    Recent Labs     02/03/25  1310 02/03/25  1311     --    K 4.3  --      --    CO2 25  --    BUN 24*  --    CREATININE 1.1  --    GLUCOSE 83 85     Hepatic: No results for input(s): \"AST\", \"ALT\", \"BILITOT\", \"ALKPHOS\" in the last 72 hours.    Invalid input(s): \"ALB\"  Lipids:   Lab Results   Component Value Date/Time    CHOL 148 10/21/2019 03:45 PM    HDL 39 02/17/2021 10:30 AM    TRIG 96 10/21/2019 03:45 PM     Hemoglobin A1C: No results found for: \"LABA1C\"  TSH: No results found for: \"TSH\"  Troponin:   Lab Results   Component Value Date/Time    TROPONINT <0.010 11/18/2022 01:15 PM     Lactic Acid: No results for input(s): \"LACTA\" in the last 72 hours.  BNP: No results for input(s): \"PROBNP\" in the last 72 hours.  UA:  Lab Results   Component Value Date/Time    NITRU NEGATIVE 04/16/2013 09:00 PM    COLORU YELLOW 04/16/2013 09:00 PM    PHUR 6.0 04/16/2013 09:00 PM    WBCUA NO CELLS

## 2025-02-03 NOTE — PROGRESS NOTES
Pharmacy Note - Renal dose adjustment made per P/T protocol    Original order:  Famotidine 20mg BID    Estimated Creatinine Clearance: 42 mL/min (based on SCr of 1.1 mg/dL).    Recent Labs     02/03/25  1310   BUN 24*   CREATININE 1.1       Renally adjusted order:  Famotidine 20mg Daily    Please call pharmacy with any questions.    Thank you,  Veto Mast [facility]  2/3/2025 6:17 PM

## 2025-02-04 LAB
ANION GAP SERPL CALCULATED.3IONS-SCNC: 13 MMOL/L (ref 4–16)
BUN SERPL-MCNC: 26 MG/DL (ref 6–23)
CALCIUM SERPL-MCNC: 7.8 MG/DL (ref 8.3–10.6)
CHLORIDE SERPL-SCNC: 108 MMOL/L (ref 99–110)
CO2 SERPL-SCNC: 24 MMOL/L (ref 21–32)
CREAT SERPL-MCNC: 1.1 MG/DL (ref 0.9–1.3)
ERYTHROCYTE [DISTWIDTH] IN BLOOD BY AUTOMATED COUNT: 13.1 % (ref 11.7–14.9)
GFR, ESTIMATED: 65 ML/MIN/1.73M2
GLUCOSE SERPL-MCNC: 77 MG/DL (ref 74–99)
HCT VFR BLD AUTO: 40 % (ref 42–52)
HGB BLD-MCNC: 12.8 G/DL (ref 13.5–18)
MAGNESIUM SERPL-MCNC: 1.6 MG/DL (ref 1.8–2.4)
MCH RBC QN AUTO: 31.8 PG (ref 27–31)
MCHC RBC AUTO-ENTMCNC: 32 G/DL (ref 32–36)
MCV RBC AUTO: 99.5 FL (ref 78–100)
PLATELET # BLD AUTO: 98 K/UL (ref 140–440)
PLATELET CONFIRMATION: NORMAL
PMV BLD AUTO: 10.9 FL (ref 7.5–11.1)
POTASSIUM SERPL-SCNC: 3.5 MMOL/L (ref 3.5–5.1)
RBC # BLD AUTO: 4.02 M/UL (ref 4.6–6.2)
SODIUM SERPL-SCNC: 145 MMOL/L (ref 135–145)
WBC OTHER # BLD: 4.7 K/UL (ref 4–10.5)

## 2025-02-04 PROCEDURE — 87081 CULTURE SCREEN ONLY: CPT

## 2025-02-04 PROCEDURE — 36415 COLL VENOUS BLD VENIPUNCTURE: CPT

## 2025-02-04 PROCEDURE — 83735 ASSAY OF MAGNESIUM: CPT

## 2025-02-04 PROCEDURE — 94640 AIRWAY INHALATION TREATMENT: CPT

## 2025-02-04 PROCEDURE — 97162 PT EVAL MOD COMPLEX 30 MIN: CPT

## 2025-02-04 PROCEDURE — 97530 THERAPEUTIC ACTIVITIES: CPT

## 2025-02-04 PROCEDURE — 94761 N-INVAS EAR/PLS OXIMETRY MLT: CPT

## 2025-02-04 PROCEDURE — 1200000000 HC SEMI PRIVATE

## 2025-02-04 PROCEDURE — 80048 BASIC METABOLIC PNL TOTAL CA: CPT

## 2025-02-04 PROCEDURE — 2500000003 HC RX 250 WO HCPCS: Performed by: NURSE PRACTITIONER

## 2025-02-04 PROCEDURE — 85027 COMPLETE CBC AUTOMATED: CPT

## 2025-02-04 PROCEDURE — 97166 OT EVAL MOD COMPLEX 45 MIN: CPT

## 2025-02-04 PROCEDURE — 6370000000 HC RX 637 (ALT 250 FOR IP): Performed by: NURSE PRACTITIONER

## 2025-02-04 PROCEDURE — 6360000002 HC RX W HCPCS: Performed by: NURSE PRACTITIONER

## 2025-02-04 PROCEDURE — 87899 AGENT NOS ASSAY W/OPTIC: CPT

## 2025-02-04 PROCEDURE — 87641 MR-STAPH DNA AMP PROBE: CPT

## 2025-02-04 PROCEDURE — 87449 NOS EACH ORGANISM AG IA: CPT

## 2025-02-04 RX ADMIN — DOXYCYCLINE HYCLATE 100 MG: 100 TABLET, COATED ORAL at 20:25

## 2025-02-04 RX ADMIN — TRAZODONE HYDROCHLORIDE 50 MG: 50 TABLET ORAL at 20:25

## 2025-02-04 RX ADMIN — FAMOTIDINE 20 MG: 20 TABLET, FILM COATED ORAL at 08:55

## 2025-02-04 RX ADMIN — IPRATROPIUM BROMIDE AND ALBUTEROL SULFATE 1 DOSE: 2.5; .5 SOLUTION RESPIRATORY (INHALATION) at 20:04

## 2025-02-04 RX ADMIN — AMLODIPINE BESYLATE 5 MG: 5 TABLET ORAL at 08:56

## 2025-02-04 RX ADMIN — CHOLECALCIFEROL TAB 25 MCG (1000 UNIT) 2000 UNITS: 25 TAB at 08:55

## 2025-02-04 RX ADMIN — OSELTAMIVIR PHOSPHATE 30 MG: 30 CAPSULE ORAL at 20:25

## 2025-02-04 RX ADMIN — OXYCODONE HYDROCHLORIDE AND ACETAMINOPHEN 1000 MG: 500 TABLET ORAL at 08:55

## 2025-02-04 RX ADMIN — IPRATROPIUM BROMIDE AND ALBUTEROL SULFATE 1 DOSE: 2.5; .5 SOLUTION RESPIRATORY (INHALATION) at 14:07

## 2025-02-04 RX ADMIN — TAMSULOSIN HYDROCHLORIDE 0.4 MG: 0.4 CAPSULE ORAL at 08:55

## 2025-02-04 RX ADMIN — MONTELUKAST 10 MG: 10 TABLET, FILM COATED ORAL at 20:25

## 2025-02-04 RX ADMIN — GUAIFENESIN 600 MG: 600 TABLET, EXTENDED RELEASE ORAL at 20:25

## 2025-02-04 RX ADMIN — SODIUM CHLORIDE, PRESERVATIVE FREE 10 ML: 5 INJECTION INTRAVENOUS at 20:25

## 2025-02-04 RX ADMIN — SODIUM CHLORIDE, PRESERVATIVE FREE 10 ML: 5 INJECTION INTRAVENOUS at 08:56

## 2025-02-04 RX ADMIN — LISINOPRIL 10 MG: 10 TABLET ORAL at 08:55

## 2025-02-04 RX ADMIN — WATER 1000 MG: 1 INJECTION INTRAMUSCULAR; INTRAVENOUS; SUBCUTANEOUS at 15:31

## 2025-02-04 RX ADMIN — IPRATROPIUM BROMIDE AND ALBUTEROL SULFATE 1 DOSE: 2.5; .5 SOLUTION RESPIRATORY (INHALATION) at 07:39

## 2025-02-04 RX ADMIN — RIVASTIGMINE TARTRATE 4.5 MG: 1.5 CAPSULE ORAL at 20:25

## 2025-02-04 RX ADMIN — RIVASTIGMINE TARTRATE 4.5 MG: 1.5 CAPSULE ORAL at 08:55

## 2025-02-04 RX ADMIN — DOXYCYCLINE HYCLATE 100 MG: 100 TABLET, COATED ORAL at 08:56

## 2025-02-04 RX ADMIN — IPRATROPIUM BROMIDE AND ALBUTEROL SULFATE 1 DOSE: 2.5; .5 SOLUTION RESPIRATORY (INHALATION) at 10:47

## 2025-02-04 RX ADMIN — OSELTAMIVIR PHOSPHATE 30 MG: 30 CAPSULE ORAL at 08:56

## 2025-02-04 RX ADMIN — GUAIFENESIN 600 MG: 600 TABLET, EXTENDED RELEASE ORAL at 08:55

## 2025-02-04 NOTE — PROGRESS NOTES
4 Eyes Skin Assessment     NAME:  Man Harris  YOB: 1940  MEDICAL RECORD NUMBER:  [id number]    The patient is being assessed for  Admission    I agree that at least one RN has performed a thorough Head to Toe Skin Assessment on the patient. ALL assessment sites listed below have been assessed.      Areas assessed by both nurses:    Head, Face, Ears, Shoulders, Back, Chest, Arms, Elbows, Hands, Sacrum. Buttock, Coccyx, Ischium, and Legs. Feet and Heels        Does the Patient have a Wound? No noted wound(s)       Edward Prevention initiated by RN: No  Wound Care Orders initiated by RN: No    Pressure Injury (Stage 3,4, Unstageable, DTI, NWPT, and Complex wounds) if present, place Wound referral order by RN under : No    New Ostomies, if present place, Ostomy referral order under : No     Nurse 1 eSignature: Electronically signed by Danielle Plasencia RN on 2/3/25 at 7:49 PM EST    **SHARE this note so that the co-signing nurse can place an eSignature**    Nurse 2 eSignature: Electronically signed by Segundo Patel RN on 2/4/25 at 7:29 AM EST

## 2025-02-04 NOTE — PROGRESS NOTES
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   Past Surgical History  has a past surgical history that includes eye surgery; eye surgery; Epididymectomy; and hernia repair ().     Pt is an 84 year old male who was recently Dx with pneumonia. Pt is functioning below baseline level, would benefit from OT services for progressing ADL's, functional mobility, endurance, and safety     Discharge Recommendations  Home with wife and Home Health if agreeable     Equipment: Has cane.  Continue to assess in home environment as pt poor historian    Assessment  Conditions Requiring Skilled Therapeutic Intervention: Decreased functional mobility, Decreased strength, Decreased endurance, Decreased ADL status, Decreased high level ADLs/IADLs, Impaired safety awareness and Impaired cognition  Assessment of Evaluation: Patient is a 84 y.o. male who presents to [facility] for the swingbed program post hospitalization for pneumonia. Patient presents below baseline functional level and would benefit from continued skilled occupational therapy services to address ADLs, functional transfers, and activity tolerance for general strengthening.   Treatment Diagnosis:  physical debility  Therapy Prognosis: Good  Decision Making: Medium Complexity  Requires OT Follow-Up: Yes  Activity Tolerance: Patient tolerated treatment well, Patient limited by fatigue, and Patient limited by endurance     Plan  General Plan: Monday-Saturday 4+/week  Treatment Initiation: Strengthening, Endurance training, Balance training, Functional mobility training, Transfer training, Neuromuscular re-education,  safely    Education  Given to: Patient  Education provided: Role of therapy, Plan of care, Precautions, Transfer training, and Fall Safety   Education method: Verbal      Therapy Time   Individual Co-Eval   Time In 14:38    Time Out 14:56    Total Time 18        Signed: Meka Pagan OT/L [id number]   2/4/2025, 5:45 PM

## 2025-02-04 NOTE — PROGRESS NOTES
nursing    Review of Systems:      Pertinent positives and negatives discussed in HPI    Objective:     Intake/Output Summary (Last 24 hours) at 2/4/2025 1523  Last data filed at 2/4/2025 1017  Gross per 24 hour   Intake 540 ml   Output --   Net 540 ml      Vitals:   Vitals:    02/04/25 0805 02/04/25 0855 02/04/25 1048 02/04/25 1408   BP:  (!) 150/62     Pulse:  80     Resp:  18     Temp:  99 °F (37.2 °C)     TempSrc:  Oral     SpO2: 97% 95% 94% 93%   Weight:       Height:             Physical Exam:    02/04/25    General: NAD  Eyes: EOMI  ENT: neck supple  Cardiovascular: Regular rate.  Respiratory: Clear to auscultation  Gastrointestinal: Soft, non tender  Genitourinary: no suprapubic tenderness  Musculoskeletal: No edema  Skin: warm, dry  Neuro: Alert.  Psych: Mood appropriate.         Medications:   Medications:    sodium chloride flush  5-40 mL IntraVENous 2 times per day    enoxaparin  40 mg SubCUTAneous Daily    ipratropium 0.5 mg-albuterol 2.5 mg  1 Dose Inhalation Q4H WA RT    guaiFENesin  600 mg Oral BID    cefTRIAXone (ROCEPHIN) IV  1,000 mg IntraVENous Q24H    And    doxycycline  100 mg Oral 2 times per day    montelukast  10 mg Oral Nightly    vitamin C  1,000 mg Oral Daily    Vitamin D  2,000 Units Oral Daily    famotidine  20 mg Oral Daily    rivastigmine  4.5 mg Oral BID    tamsulosin  0.4 mg Oral Daily    traZODone  50 mg Oral Nightly    oseltamivir  30 mg Oral BID    amLODIPine  5 mg Oral Daily    And    lisinopril  10 mg Oral Daily      Infusions:    sodium chloride       PRN Meds: sodium chloride flush, 5-40 mL, PRN  sodium chloride, 250 mL, PRN  ondansetron, 4 mg, Q8H PRN   Or  ondansetron, 4 mg, Q6H PRN  polyethylene glycol, 17 g, Daily PRN  acetaminophen, 650 mg, Q6H PRN   Or  acetaminophen, 650 mg, Q6H PRN  benzonatate, 100 mg, TID PRN  fluticasone, 2 spray, Daily PRN        Labs and Imaging   XR CHEST PORTABLE    Result Date: 2/3/2025  EXAMINATION: XR CHEST PORTABLE DATE OF EXAM:  2/3/2025  13:07 DEMOGRAPHICS: 84 years old Male INDICATION: Weakness COMPARISON: No existing relevant imaging study corresponding to the same anatomical region is available. TECHNIQUE: Single AP portable chest radiograph was obtained. FINDINGS: The vascular pedicle and cardiac silhouette are normal size. Aortic calcification is seen. Mild patchy alveolar opacity is seen in the right mid to lower lung and at the left lung base. The upper lung fields are clear. There is advanced osteoarthrosis of the right shoulder. IMPRESSION: 1.  Patchy alveolar opacity seen in the lungs as described. Atelectasis or pneumonia would be a consideration in the correct clinical setting 2.  Advanced osteoarthrosis of the right shoulder This dictation was created with voice recognition software.  While attempts have  been made to review the dictation as it is transcribed, on occasion the spoken word can be misinterpreted by the technology leading to omissions or inappropriate words, phrases or sentences.  Dictated and Electronically Signed By: Stephen Malone MD 2/3/2025 13:40          CBC:   Recent Labs     02/03/25  1310 02/04/25  0420   WBC 6.0 4.7   HGB 12.9* 12.8*   * 98*     BMP:    Recent Labs     02/03/25  1310 02/03/25  1311 02/04/25  0420     --  145   K 4.3  --  3.5     --  108   CO2 25  --  24   BUN 24*  --  26*   CREATININE 1.1  --  1.1   GLUCOSE 83 85 77     Hepatic: No results for input(s): \"AST\", \"ALT\", \"BILITOT\", \"ALKPHOS\" in the last 72 hours.    Invalid input(s): \"ALB\"  Lipids:   Lab Results   Component Value Date/Time    CHOL 148 10/21/2019 03:45 PM    HDL 39 02/17/2021 10:30 AM    TRIG 96 10/21/2019 03:45 PM     Hemoglobin A1C: No results found for: \"LABA1C\"  TSH: No results found for: \"TSH\"  Troponin:   Lab Results   Component Value Date/Time    TROPONINT <0.010 11/18/2022 01:15 PM     Lactic Acid: No results for input(s): \"LACTA\" in the last 72 hours.  BNP: No results for input(s): \"PROBNP\" in the last 72

## 2025-02-04 NOTE — PROGRESS NOTES
I discussed the patient with the BESSY and was available for questions and consultation as needed. I personally saw the patient and made/approved the management plan and take responsibility for the patient management today.    History:   States that he had a cough with green phlegm. Also had some nausea. States it was going on for a few days.     Exam:   No acute distress  S1s2 regular rate  Coarse breath sounds with cough, slight wheeze  Abd soft nt nd  No tenderness, no edema in LE    MDM:   -Dyspnea due to influenza A and possible pneumonia: Wean off oxygen  - Influenza A: On Tamiflu  - Possible pneumonia: Seen on CXR.  Repeat CXR is 2 view.  On antibiotics.  Cultures pending.          Rubio Ramirez MD  2/4/25

## 2025-02-04 NOTE — PROGRESS NOTES
Physical Therapy  Facility/Department: [facility]  Physical Therapy Initial Assessment    Name: Man Harris  : 1940  MRN: [id number]  Date of Service: 2025    Discharge Recommendations:  Continue to assess pending progress, expected progress would recommend Home with Home health PT, Home with assist PRN          Patient Diagnosis(es): The primary encounter diagnosis was Influenza A. Diagnoses of Hypoxemia and Generalized weakness were also pertinent to this visit.  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  Past Surgical History:  has a past surgical history that includes eye surgery; eye surgery; Epididymectomy; and hernia repair ().    Assessment   Patient is a 84 y.o. male who presents to [facility] with debility. Patient would benefit from continued skilled physical therapy services to address decreased strength, ROM, and endurance, impaired balance, and decline in functional mobilit   Body Structures, Functions, Activity Limitations Requiring Skilled Therapeutic Intervention: Decreased functional mobility   Treatment Diagnosis: physical debility  Therapy Prognosis: Good  Decision Making: Medium Complexity  History: PF -age  Exam: strength/transfers  Clinical Presentation: evolving  Barriers to Learning: none  Requires PT Follow-Up: Yes    Plan  Physical Therapy Plan  General Plan: 3-5 times per week  Current Treatment Recommendations: Strengthening, Functional mobility training, Endurance training, Gait training, Transfer training  Safety Devices  Type of Devices: Bed alarm in place, Call light within reach, Chair alarm in place, Left in chair    Restrictions  Restrictions/Precautions  Restrictions/Precautions: Other (Comment), Fall Risk (droplet)     Subjective  General  Chart Reviewed: Yes  Patient assessed for rehabilitation services?: Yes  Follows Commands: Within

## 2025-02-04 NOTE — PLAN OF CARE
Problem: Safety - Adult  Goal: Free from fall injury  Outcome: Progressing     Problem: Respiratory - Adult  Goal: Achieves optimal ventilation and oxygenation  Outcome: Progressing     Problem: ABCDS Injury Assessment  Goal: Absence of physical injury  Outcome: Progressing

## 2025-02-04 NOTE — CARE COORDINATION
02/04/25 0710   Service Assessment   Patient Orientation Alert and Oriented   Cognition Dementia / Early Alzheimer's   History Provided By Patient   Primary Caregiver Spouse   Support Systems Spouse/Significant Other;Children   Patient's Healthcare Decision Maker is: Patient Declined (Legal Next of Kin Remains as Decision Maker)   PCP Verified by CM No  (PCP just retired 1/31/25)   Prior Functional Level Assistance with the following:;Mobility   Current Functional Level Assistance with the following:;Mobility   Can patient return to prior living arrangement Yes   Ability to make needs known: Good   Family able to assist with home care needs: Yes   Would you like for me to discuss the discharge plan with any other family members/significant others, and if so, who? Yes  (Wife)   Financial Resources Medicare   Social/Functional History   Lives With Spouse   Type of Home House   Home Equipment Cane   Receives Help From Family   Discharge Planning   Type of Residence House   Living Arrangements Spouse/Significant Other   Current Services Prior To Admission None   Patient expects to be discharged to: House   Services At/After Discharge   Mode of Transport at Discharge Other (see comment)  (Family)   Confirm Follow Up Transport Family   Condition of Participation: Discharge Planning   The Plan for Transition of Care is related to the following treatment goals: Plans to return home   The Patient and/or Patient Representative was provided with a Choice of Provider? Patient   The Patient and/Or Patient Representative agree with the Discharge Plan? Yes   Freedom of Choice list was provided with basic dialogue that supports the patient's individualized plan of care/goals, treatment preferences, and shares the quality data associated with the providers?  Yes     CM met with the patient to initiate discharge planning, patient resting quietly in bed on room air.  Patient lives at home with his wife, has medical coverage, does

## 2025-02-05 LAB
ANION GAP SERPL CALCULATED.3IONS-SCNC: 12 MMOL/L (ref 4–16)
BUN SERPL-MCNC: 33 MG/DL (ref 6–23)
CALCIUM SERPL-MCNC: 8.2 MG/DL (ref 8.3–10.6)
CHLORIDE SERPL-SCNC: 110 MMOL/L (ref 99–110)
CO2 SERPL-SCNC: 22 MMOL/L (ref 21–32)
CREAT SERPL-MCNC: 1.1 MG/DL (ref 0.9–1.3)
ERYTHROCYTE [DISTWIDTH] IN BLOOD BY AUTOMATED COUNT: 13.2 % (ref 11.7–14.9)
GFR, ESTIMATED: 65 ML/MIN/1.73M2
GLUCOSE SERPL-MCNC: 83 MG/DL (ref 74–99)
HCT VFR BLD AUTO: 40 % (ref 42–52)
HGB BLD-MCNC: 12.7 G/DL (ref 13.5–18)
L PNEUMO1 AG UR QL IA.RAPID: NEGATIVE
MAGNESIUM SERPL-MCNC: 1.8 MG/DL (ref 1.8–2.4)
MCH RBC QN AUTO: 31.8 PG (ref 27–31)
MCHC RBC AUTO-ENTMCNC: 31.8 G/DL (ref 32–36)
MCV RBC AUTO: 100.3 FL (ref 78–100)
MRSA, DNA, NASAL: NOT DETECTED
PLATELET # BLD AUTO: 96 K/UL (ref 140–440)
PLATELET CONFIRMATION: NORMAL
PMV BLD AUTO: 10.9 FL (ref 7.5–11.1)
POTASSIUM SERPL-SCNC: 3.9 MMOL/L (ref 3.5–5.1)
RBC # BLD AUTO: 3.99 M/UL (ref 4.6–6.2)
S PNEUM AG SPEC QL: NEGATIVE
SODIUM SERPL-SCNC: 144 MMOL/L (ref 135–145)
SPECIMEN DESCRIPTION: NORMAL
SPECIMEN SOURCE: NORMAL
WBC OTHER # BLD: 4.5 K/UL (ref 4–10.5)

## 2025-02-05 PROCEDURE — 36415 COLL VENOUS BLD VENIPUNCTURE: CPT

## 2025-02-05 PROCEDURE — 6370000000 HC RX 637 (ALT 250 FOR IP): Performed by: NURSE PRACTITIONER

## 2025-02-05 PROCEDURE — 2500000003 HC RX 250 WO HCPCS: Performed by: NURSE PRACTITIONER

## 2025-02-05 PROCEDURE — 94640 AIRWAY INHALATION TREATMENT: CPT

## 2025-02-05 PROCEDURE — 94761 N-INVAS EAR/PLS OXIMETRY MLT: CPT

## 2025-02-05 PROCEDURE — 85027 COMPLETE CBC AUTOMATED: CPT

## 2025-02-05 PROCEDURE — 6360000002 HC RX W HCPCS: Performed by: NURSE PRACTITIONER

## 2025-02-05 PROCEDURE — 80048 BASIC METABOLIC PNL TOTAL CA: CPT

## 2025-02-05 PROCEDURE — 1200000000 HC SEMI PRIVATE

## 2025-02-05 PROCEDURE — 83735 ASSAY OF MAGNESIUM: CPT

## 2025-02-05 PROCEDURE — 97116 GAIT TRAINING THERAPY: CPT

## 2025-02-05 RX ORDER — RIVASTIGMINE TARTRATE 1.5 MG/1
4.5 CAPSULE ORAL 2 TIMES DAILY WITH MEALS
Status: DISCONTINUED | OUTPATIENT
Start: 2025-02-05 | End: 2025-02-06 | Stop reason: HOSPADM

## 2025-02-05 RX ORDER — TAMSULOSIN HYDROCHLORIDE 0.4 MG/1
0.4 CAPSULE ORAL EVERY EVENING
Status: DISCONTINUED | OUTPATIENT
Start: 2025-02-05 | End: 2025-02-06 | Stop reason: HOSPADM

## 2025-02-05 RX ADMIN — MONTELUKAST 10 MG: 10 TABLET, FILM COATED ORAL at 19:58

## 2025-02-05 RX ADMIN — RIVASTIGMINE TARTRATE 4.5 MG: 1.5 CAPSULE ORAL at 09:27

## 2025-02-05 RX ADMIN — DOXYCYCLINE HYCLATE 100 MG: 100 TABLET, COATED ORAL at 19:58

## 2025-02-05 RX ADMIN — AMLODIPINE BESYLATE 5 MG: 5 TABLET ORAL at 09:28

## 2025-02-05 RX ADMIN — LISINOPRIL 10 MG: 10 TABLET ORAL at 09:28

## 2025-02-05 RX ADMIN — TAMSULOSIN HYDROCHLORIDE 0.4 MG: 0.4 CAPSULE ORAL at 17:22

## 2025-02-05 RX ADMIN — IPRATROPIUM BROMIDE AND ALBUTEROL SULFATE 1 DOSE: 2.5; .5 SOLUTION RESPIRATORY (INHALATION) at 14:55

## 2025-02-05 RX ADMIN — CHOLECALCIFEROL TAB 25 MCG (1000 UNIT) 2000 UNITS: 25 TAB at 09:28

## 2025-02-05 RX ADMIN — WATER 1000 MG: 1 INJECTION INTRAMUSCULAR; INTRAVENOUS; SUBCUTANEOUS at 15:22

## 2025-02-05 RX ADMIN — RIVASTIGMINE TARTRATE 4.5 MG: 1.5 CAPSULE ORAL at 17:22

## 2025-02-05 RX ADMIN — SODIUM CHLORIDE, PRESERVATIVE FREE 10 ML: 5 INJECTION INTRAVENOUS at 09:29

## 2025-02-05 RX ADMIN — SODIUM CHLORIDE, PRESERVATIVE FREE 10 ML: 5 INJECTION INTRAVENOUS at 19:58

## 2025-02-05 RX ADMIN — GUAIFENESIN 600 MG: 600 TABLET, EXTENDED RELEASE ORAL at 09:28

## 2025-02-05 RX ADMIN — GUAIFENESIN 600 MG: 600 TABLET, EXTENDED RELEASE ORAL at 19:58

## 2025-02-05 RX ADMIN — IPRATROPIUM BROMIDE AND ALBUTEROL SULFATE 1 DOSE: 2.5; .5 SOLUTION RESPIRATORY (INHALATION) at 19:31

## 2025-02-05 RX ADMIN — FAMOTIDINE 20 MG: 20 TABLET, FILM COATED ORAL at 09:27

## 2025-02-05 RX ADMIN — OXYCODONE HYDROCHLORIDE AND ACETAMINOPHEN 1000 MG: 500 TABLET ORAL at 09:28

## 2025-02-05 RX ADMIN — ENOXAPARIN SODIUM 40 MG: 100 INJECTION SUBCUTANEOUS at 09:28

## 2025-02-05 RX ADMIN — TRAZODONE HYDROCHLORIDE 50 MG: 50 TABLET ORAL at 19:58

## 2025-02-05 RX ADMIN — IPRATROPIUM BROMIDE AND ALBUTEROL SULFATE 1 DOSE: 2.5; .5 SOLUTION RESPIRATORY (INHALATION) at 11:25

## 2025-02-05 RX ADMIN — DOXYCYCLINE HYCLATE 100 MG: 100 TABLET, COATED ORAL at 09:28

## 2025-02-05 RX ADMIN — OSELTAMIVIR PHOSPHATE 30 MG: 30 CAPSULE ORAL at 19:58

## 2025-02-05 RX ADMIN — OSELTAMIVIR PHOSPHATE 30 MG: 30 CAPSULE ORAL at 09:28

## 2025-02-05 RX ADMIN — IPRATROPIUM BROMIDE AND ALBUTEROL SULFATE 1 DOSE: 2.5; .5 SOLUTION RESPIRATORY (INHALATION) at 07:21

## 2025-02-05 ASSESSMENT — PAIN SCALES - GENERAL
PAINLEVEL_OUTOF10: 0
PAINLEVEL_OUTOF10: 0

## 2025-02-05 NOTE — PLAN OF CARE
Problem: Safety - Adult  Goal: Free from fall injury  2/5/2025 1154 by Geetha Tang RN  Outcome: Progressing  2/5/2025 0032 by Yeni Stevenson RN  Outcome: Progressing     Problem: Respiratory - Adult  Goal: Achieves optimal ventilation and oxygenation  2/5/2025 1154 by Geetha Tang RN  Outcome: Progressing  2/5/2025 0032 by Yeni Stevenson RN  Outcome: Progressing     Problem: ABCDS Injury Assessment  Goal: Absence of physical injury  2/5/2025 1154 by Geetha Tang RN  Outcome: Progressing  2/5/2025 0032 by Yeni Stevenson RN  Outcome: Progressing     Problem: Pain  Goal: Verbalizes/displays adequate comfort level or baseline comfort level  Outcome: Progressing     Problem: Chronic Conditions and Co-morbidities  Goal: Patient's chronic conditions and co-morbidity symptoms are monitored and maintained or improved  Outcome: Progressing     Problem: Discharge Planning  Goal: Discharge to home or other facility with appropriate resources  Outcome: Progressing

## 2025-02-05 NOTE — PROGRESS NOTES
This RN has reviewed and agrees with Dena Bone LPN's data collection and has collaborated with this LPN regarding the patient's care plan.

## 2025-02-05 NOTE — PROGRESS NOTES
V2.0    [facility] Progress Note      Name:  Man Harris /Age/Sex: 1940  (84 y.o. male)   MRN & CSN:  [id number] & [id number] Encounter Date/Time: 2025 3:23 PM EST   Location:   PCP: Orlando Ramirez MD     Attending:Rubio Ramirez MD       Hospital Day: 3    Assessment and Recommendations   Man Harris is a 84 y.o. male who presents with Pneumonia, unspecified organism      Plan:   Acute respiratory failure with hypoxia,   Influenza A, patient has been started on Tamiflu, supportive care  Multifocal pneumonia, suspected  Requiring supplemental oxygen on admission to maintain respiratory status.  Now stable on room air  CXR: Patchy alveolar opacity seen in the lungs as described. Atelectasis or pneumonia would be a consideration in the correct clinical setting  MRSA screening swab negative, negative urine antigens  Continue Tamiflu  Empiric Rocephin/doxycycline  PRN symptom control   Bronchodilators   Dispo: plan for discharge in am   CM to assist with HHC.  PCP recently retired and pending initial appointment to establish with Dr. Christina Ramirez. HHC to start afterwards  Electrolyte abnormalities  Hypomagnesemia  Resolved  Hypertension, continue lotrel   Dementia,   Continue Exelon/trazodone  BPH,   continue Flomax         Diet ADULT DIET; Regular; Low Fat/Low Chol/High Fiber/ELLA; Lactose-Controlled; Lactose Intolerance. NO MILK PRODUCTS.   DVT Prophylaxis [x] Lovenox, []  Heparin, [] SCDs, [] Ambulation,  [] Eliquis, [] Xarelto  [] Coumadin   Code Status Full Code   Disposition From: Home  Expected Disposition: Home  Estimated Date of Discharge:   Patient requires continued admission due to management of above   Surrogate Decision Maker/ POA Spouse     Personally reviewed Lab Studies and Imaging     Discussed personally with therapy team who recommend home health care      Subjective:     Chief Complaint: Fatigue and generalized weakness    Man Harris is a 84 y.o. male who presents

## 2025-02-05 NOTE — CARE COORDINATION
CM met with the patient and his wife for a follow-up discussion regarding discharge planning.  CM notified the patient and his wife that PT/OT has recommended home therapy following discharge.  Lisa voiced agreement and requested that the referral be made to [facility] as they have previous experience with that agency.  Patient and wife unable to identify any other needs at this time.  CM will follow.      11:18 AM  CM met with the patient's wife Lisa regarding lack of PCP since the patient's PCP retired as of 1/31/25.  Lisa advised that the patient has his first appointment with Orlando Ramirez MD Tuesday 2/11/25.  CM will follow.      11:30 AM  CM spoke with Dr. Orlando Ramirez's office regarding HHC referral.  Dr. SHERYL Ramirez will not agree to oversee HHC services until he has seen the patient in his office.  The office advised to go ahead and make the HHC referral and have the HHC agency contact their office the afternoon of 2/11/25 to receive approval from the physician.  CM will follow.      11:45 AM  CM spoke with Livia at [facility] and explained situation regarding new PCP.  Livia advised that they can accept the referral but will not be able to start services until they get approval from Dr. Orlando Ramirez following the patient's first appointment 2/11/25.  CM faxed the HHC order and supporting documentation to Livia to initiate the referral.  CM will follow.       12 PM  CM spoke with the patient's wife Lisa via telephone to notify her that [facility] has accepted the referral but cannot start services until they speak with Dr. Orlando Ramirez's office 2/11/25, Lisa voiced understanding.  CM will follow.

## 2025-02-05 NOTE — PROGRESS NOTES
Comprehensive Nutrition Assessment    Type and Reason for Visit:  Initial (Low BMI for age)    Nutrition Recommendations/Plan:   Continue to provide cardiac diet (if po intake consistently decreases recommend to liberalize to ELLA)  Offer snacks between meals  Please document accurate weights and po intakes  Will monitor weights, labs, po intakes and POC     Malnutrition Assessment:  Malnutrition Status:  No malnutrition (02/05/25 6718)    Context:  Acute Illness     Findings of the 6 clinical characteristics of malnutrition:  Energy Intake:  No decrease in energy intake (Pt denies changes except for at lunch today)  Weight Loss:  No weight loss (Pt does have hx of wt loss, however, intentional wt loss reported per pt)     Body Fat Loss:  No body fat loss     Muscle Mass Loss:  No muscle mass loss    Fluid Accumulation:  Unable to assess     Strength:  Not Performed    Nutrition Assessment:    Pt admitted from home with wife for hypoxemia, Influenza A, pneumonia. Hx includes HTN. Diet order Cardiac (if po intake not adequate can liberalize to ELLA). Noted pt reports his appetite has been pretty good until today at lunch he was not feeling as hungry. Pt denies need for oral supplements. Noted intakes vary from 25% to %. Pt also reports \"I have lost weight but I was meaning to. I needed to get healthier.\" Did encourage more weight maintenance at this time than losing further weight and pt expressed understanding. Pt does appear thin but with no s/s malnutrition via muscle or fat wasting. Recommend to offer snacks between meals to optimize po intake. If po intake does not consistently improve will be at risk for malnutrition. At this time will monitor and follow at moderate nutrition risk to ensure adequate oral intake.    Nutrition Related Findings:    Labs and meds reviewed. Wound Type: None       Current Nutrition Intake & Therapies:    Average Meal Intake: 1-25%, %, 26-50%  Average Supplements Intake:

## 2025-02-05 NOTE — FLOWSHEET NOTE
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    Hypertension     Pollen allergies     Psoriasis      Past Surgical History:   Procedure Laterality Date    EPIDIDYMECTOMY      EYE SURGERY      lesion biopsy of right eye    EYE SURGERY      cataract extraction right eye    HERNIA REPAIR      Umbilical hernis     Rehabilitation Diagnosis: physical debility  Restrictions/Precautions: droplet plus    Subjective: agreeable to PT  Initial Pain level: 0/10    Goals:                   Short Term Goals  Time Frame for Short Term Goals: 1 week  Short Term Goal 1: patient will perform aall nbed mobility andtransfer activities with no more than SBA  Short Term Goal 2: patient will ambulate 150 ft using cane with no more than SBA               Plan of Care:                          Current Treatment Recommendations: Strengthening, Functional mobility training, Endurance training, Gait training, Transfer training      Objective Findings:    Date: 25 Date:  Date:  Date:  Date:    Bed mobility S       Sit to stand transfer SBA       Stand to sit transfer SBA       Commode transfer        Standing tolerance        Ambulation Using SPC - patient ambulated 50 ft with SBA/ pm ambulated 75 ft using cane with SBA       Stairs          Exercises:   Exercise/Equipment/Modalities  Date:  Date:  Date:  Date:                                                                               Modality/intervention used:   [x ] Therapeutic Exercise   [ ] Modalities:   [ x] Therapeutic Activity     [ ] Ultrasound   [ ] Elec Stim   [ x] Gait Training     [ ] Cervical Traction  [ ] Lumbar Traction   [ ] Neuromuscular Re-education   [ ] Cold/hotpack  [ ] Iontophoresis   [ ] Instruction in HEP

## 2025-02-05 NOTE — PROGRESS NOTES
I did NOT see the patient. The patient was discussed with the BESSY. I was available for questions and consultation as needed.       On room air

## 2025-02-05 NOTE — PLAN OF CARE
Problem: Safety - Adult  Goal: Free from fall injury  2/5/2025 0032 by Yeni Stevenson RN  Outcome: Progressing  2/4/2025 1054 by Yeni River RN  Outcome: Progressing     Problem: Respiratory - Adult  Goal: Achieves optimal ventilation and oxygenation  2/5/2025 0032 by Yeni Stevenson RN  Outcome: Progressing  2/4/2025 1054 by Yeni River RN  Outcome: Progressing     Problem: ABCDS Injury Assessment  Goal: Absence of physical injury  2/5/2025 0032 by Yeni Stevenson RN  Outcome: Progressing  2/4/2025 1054 by Yeni River RN  Outcome: Progressing

## 2025-02-06 VITALS
HEIGHT: 68 IN | RESPIRATION RATE: 16 BRPM | OXYGEN SATURATION: 99 % | WEIGHT: 137.4 LBS | HEART RATE: 101 BPM | SYSTOLIC BLOOD PRESSURE: 126 MMHG | BODY MASS INDEX: 20.82 KG/M2 | DIASTOLIC BLOOD PRESSURE: 67 MMHG | TEMPERATURE: 98.1 F

## 2025-02-06 LAB
ANION GAP SERPL CALCULATED.3IONS-SCNC: 11 MMOL/L (ref 4–16)
BUN SERPL-MCNC: 30 MG/DL (ref 6–23)
CALCIUM SERPL-MCNC: 8.6 MG/DL (ref 8.3–10.6)
CHLORIDE SERPL-SCNC: 108 MMOL/L (ref 99–110)
CO2 SERPL-SCNC: 24 MMOL/L (ref 21–32)
CREAT SERPL-MCNC: 0.9 MG/DL (ref 0.9–1.3)
ERYTHROCYTE [DISTWIDTH] IN BLOOD BY AUTOMATED COUNT: 13 % (ref 11.7–14.9)
GFR, ESTIMATED: 83 ML/MIN/1.73M2
GLUCOSE SERPL-MCNC: 83 MG/DL (ref 74–99)
HCT VFR BLD AUTO: 41 % (ref 42–52)
HGB BLD-MCNC: 13.4 G/DL (ref 13.5–18)
MAGNESIUM SERPL-MCNC: 1.7 MG/DL (ref 1.8–2.4)
MCH RBC QN AUTO: 32.4 PG (ref 27–31)
MCHC RBC AUTO-ENTMCNC: 32.7 G/DL (ref 32–36)
MCV RBC AUTO: 99.3 FL (ref 78–100)
PLATELET # BLD AUTO: 97 K/UL (ref 140–440)
PLATELET CONFIRMATION: NORMAL
PMV BLD AUTO: 11.2 FL (ref 7.5–11.1)
POTASSIUM SERPL-SCNC: 3.9 MMOL/L (ref 3.5–5.1)
RBC # BLD AUTO: 4.13 M/UL (ref 4.6–6.2)
SODIUM SERPL-SCNC: 143 MMOL/L (ref 135–145)
WBC OTHER # BLD: 3.2 K/UL (ref 4–10.5)

## 2025-02-06 PROCEDURE — 6370000000 HC RX 637 (ALT 250 FOR IP): Performed by: NURSE PRACTITIONER

## 2025-02-06 PROCEDURE — 94761 N-INVAS EAR/PLS OXIMETRY MLT: CPT

## 2025-02-06 PROCEDURE — 85027 COMPLETE CBC AUTOMATED: CPT

## 2025-02-06 PROCEDURE — 80048 BASIC METABOLIC PNL TOTAL CA: CPT

## 2025-02-06 PROCEDURE — 36415 COLL VENOUS BLD VENIPUNCTURE: CPT

## 2025-02-06 PROCEDURE — 94640 AIRWAY INHALATION TREATMENT: CPT

## 2025-02-06 PROCEDURE — 2500000003 HC RX 250 WO HCPCS: Performed by: NURSE PRACTITIONER

## 2025-02-06 PROCEDURE — 83735 ASSAY OF MAGNESIUM: CPT

## 2025-02-06 PROCEDURE — 6360000002 HC RX W HCPCS: Performed by: NURSE PRACTITIONER

## 2025-02-06 RX ORDER — DOXYCYCLINE HYCLATE 100 MG
100 TABLET ORAL 2 TIMES DAILY
Qty: 3 TABLET | Refills: 0 | Status: SHIPPED | OUTPATIENT
Start: 2025-02-06 | End: 2025-02-08

## 2025-02-06 RX ORDER — OSELTAMIVIR PHOSPHATE 30 MG/1
30 CAPSULE ORAL 2 TIMES DAILY
Qty: 4 CAPSULE | Refills: 0 | Status: SHIPPED | OUTPATIENT
Start: 2025-02-06 | End: 2025-02-08

## 2025-02-06 RX ORDER — GUAIFENESIN 600 MG/1
600 TABLET, EXTENDED RELEASE ORAL 2 TIMES DAILY
Qty: 60 TABLET | Refills: 0 | Status: SHIPPED | OUTPATIENT
Start: 2025-02-06

## 2025-02-06 RX ORDER — BENZONATATE 100 MG/1
100 CAPSULE ORAL 3 TIMES DAILY PRN
Qty: 21 CAPSULE | Refills: 0 | Status: SHIPPED | OUTPATIENT
Start: 2025-02-06 | End: 2025-02-13

## 2025-02-06 RX ORDER — CEFDINIR 300 MG/1
300 CAPSULE ORAL 2 TIMES DAILY
Qty: 4 CAPSULE | Refills: 0 | Status: SHIPPED | OUTPATIENT
Start: 2025-02-06 | End: 2025-02-08

## 2025-02-06 RX ADMIN — IPRATROPIUM BROMIDE AND ALBUTEROL SULFATE 1 DOSE: 2.5; .5 SOLUTION RESPIRATORY (INHALATION) at 08:00

## 2025-02-06 RX ADMIN — CHOLECALCIFEROL TAB 25 MCG (1000 UNIT) 2000 UNITS: 25 TAB at 08:15

## 2025-02-06 RX ADMIN — OXYCODONE HYDROCHLORIDE AND ACETAMINOPHEN 1000 MG: 500 TABLET ORAL at 08:15

## 2025-02-06 RX ADMIN — SODIUM CHLORIDE, PRESERVATIVE FREE 10 ML: 5 INJECTION INTRAVENOUS at 08:15

## 2025-02-06 RX ADMIN — GUAIFENESIN 600 MG: 600 TABLET, EXTENDED RELEASE ORAL at 08:15

## 2025-02-06 RX ADMIN — ENOXAPARIN SODIUM 40 MG: 100 INJECTION SUBCUTANEOUS at 08:13

## 2025-02-06 RX ADMIN — FAMOTIDINE 20 MG: 20 TABLET, FILM COATED ORAL at 08:15

## 2025-02-06 RX ADMIN — LISINOPRIL 10 MG: 10 TABLET ORAL at 08:15

## 2025-02-06 RX ADMIN — DOXYCYCLINE HYCLATE 100 MG: 100 TABLET, COATED ORAL at 08:14

## 2025-02-06 RX ADMIN — RIVASTIGMINE TARTRATE 4.5 MG: 1.5 CAPSULE ORAL at 08:14

## 2025-02-06 RX ADMIN — OSELTAMIVIR PHOSPHATE 30 MG: 30 CAPSULE ORAL at 08:15

## 2025-02-06 RX ADMIN — AMLODIPINE BESYLATE 5 MG: 5 TABLET ORAL at 08:14

## 2025-02-06 RX ADMIN — IPRATROPIUM BROMIDE AND ALBUTEROL SULFATE 1 DOSE: 2.5; .5 SOLUTION RESPIRATORY (INHALATION) at 11:20

## 2025-02-06 ASSESSMENT — PAIN SCALES - GENERAL: PAINLEVEL_OUTOF10: 0

## 2025-02-06 NOTE — PLAN OF CARE
Problem: Safety - Adult  Goal: Free from fall injury  2/6/2025 0737 by Geetha Tang RN  Outcome: Progressing  2/6/2025 0643 by Yeni Stevenson RN  Outcome: Progressing     Problem: Respiratory - Adult  Goal: Achieves optimal ventilation and oxygenation  2/6/2025 0737 by Geetha Tang RN  Outcome: Progressing  2/6/2025 0643 by Yeni Stevenson RN  Outcome: Progressing     Problem: ABCDS Injury Assessment  Goal: Absence of physical injury  2/6/2025 0737 by Geetha Tang RN  Outcome: Progressing  2/6/2025 0643 by Yeni Stevenson RN  Outcome: Progressing     Problem: Pain  Goal: Verbalizes/displays adequate comfort level or baseline comfort level  2/6/2025 0737 by Geetha Tang RN  Outcome: Progressing  2/6/2025 0643 by Yeni Stevenson RN  Outcome: Progressing     Problem: Chronic Conditions and Co-morbidities  Goal: Patient's chronic conditions and co-morbidity symptoms are monitored and maintained or improved  2/6/2025 0737 by Geetha Tang RN  Outcome: Progressing  2/6/2025 0643 by Yeni Stevenson RN  Outcome: Progressing     Problem: Discharge Planning  Goal: Discharge to home or other facility with appropriate resources  2/6/2025 0737 by Geetha Tang RN  Outcome: Progressing  2/6/2025 0643 by Yeni Stevenson RN  Outcome: Progressing     Problem: Nutrition Deficit:  Goal: Optimize nutritional status  2/6/2025 0737 by Geetha Tang RN  Outcome: Progressing  2/6/2025 0643 by Yeni Stevenson RN  Outcome: Progressing

## 2025-02-06 NOTE — PLAN OF CARE
Problem: Safety - Adult  Goal: Free from fall injury  2/6/2025 1232 by Geetha Tang RN  Outcome: Adequate for Discharge  2/6/2025 0737 by Geetha Tang RN  Outcome: Progressing  2/6/2025 0643 by Yeni Stevenson RN  Outcome: Progressing     Problem: Respiratory - Adult  Goal: Achieves optimal ventilation and oxygenation  2/6/2025 1232 by Geetha Tang RN  Outcome: Adequate for Discharge  Flowsheets (Taken 2/6/2025 0821)  Achieves optimal ventilation and oxygenation:   Assess for changes in mentation and behavior   Position to facilitate oxygenation and minimize respiratory effort   Assess for changes in respiratory status   Oxygen supplementation based on oxygen saturation or arterial blood gases   Encourage broncho-pulmonary hygiene including cough, deep breathe, incentive spirometry   Assess and instruct to report shortness of breath or any respiratory difficulty   Respiratory therapy support as indicated  2/6/2025 0737 by Geetha Tang RN  Outcome: Progressing  2/6/2025 0643 by Yeni Stevenson RN  Outcome: Progressing     Problem: ABCDS Injury Assessment  Goal: Absence of physical injury  2/6/2025 1232 by Geetha Tang RN  Outcome: Adequate for Discharge  2/6/2025 0737 by Geetha Tang RN  Outcome: Progressing  2/6/2025 0643 by Yeni Stevenson RN  Outcome: Progressing     Problem: Pain  Goal: Verbalizes/displays adequate comfort level or baseline comfort level  2/6/2025 1232 by Geetha Tang RN  Outcome: Adequate for Discharge  2/6/2025 0737 by Geetha Tang RN  Outcome: Progressing  2/6/2025 0643 by Yeni Stevenson RN  Outcome: Progressing     Problem: Chronic Conditions and Co-morbidities  Goal: Patient's chronic conditions and co-morbidity symptoms are monitored and maintained or improved  2/6/2025 1232 by Geetha Tang RN  Outcome: Adequate for Discharge  Flowsheets (Taken 2/6/2025 0821)  Care Plan - Patient's Chronic Conditions and Co-Morbidity  Symptoms are Monitored and Maintained or Improved:   Monitor and assess patient's chronic conditions and comorbid symptoms for stability, deterioration, or improvement   Collaborate with multidisciplinary team to address chronic and comorbid conditions and prevent exacerbation or deterioration  2/6/2025 0737 by Geetha Tang RN  Outcome: Progressing  2/6/2025 0643 by Yeni Stevenson RN  Outcome: Progressing     Problem: Discharge Planning  Goal: Discharge to home or other facility with appropriate resources  2/6/2025 1232 by Geetha Tang RN  Outcome: Adequate for Discharge  Flowsheets (Taken 2/6/2025 0821)  Discharge to home or other facility with appropriate resources:   Identify barriers to discharge with patient and caregiver   Refer to discharge planning if patient needs post-hospital services based on physician order or complex needs related to functional status, cognitive ability or social support system  2/6/2025 0737 by Geetha Tang RN  Outcome: Progressing  2/6/2025 0643 by Yeni Stevenson RN  Outcome: Progressing     Problem: Nutrition Deficit:  Goal: Optimize nutritional status  2/6/2025 1232 by Geetha Tang RN  Outcome: Adequate for Discharge  2/6/2025 0737 by Geetha Tang RN  Outcome: Progressing  2/6/2025 0643 by Yeni Stevenson RN  Outcome: Progressing

## 2025-02-06 NOTE — PLAN OF CARE
Problem: Safety - Adult  Goal: Free from fall injury  Outcome: Progressing     Problem: Respiratory - Adult  Goal: Achieves optimal ventilation and oxygenation  Outcome: Progressing     Problem: ABCDS Injury Assessment  Goal: Absence of physical injury  Outcome: Progressing     Problem: Pain  Goal: Verbalizes/displays adequate comfort level or baseline comfort level  Outcome: Progressing     Problem: Chronic Conditions and Co-morbidities  Goal: Patient's chronic conditions and co-morbidity symptoms are monitored and maintained or improved  Outcome: Progressing     Problem: Discharge Planning  Goal: Discharge to home or other facility with appropriate resources  Outcome: Progressing     Problem: Nutrition Deficit:  Goal: Optimize nutritional status  Outcome: Progressing

## 2025-02-06 NOTE — PROGRESS NOTES
Pt is going to be discharged to go home. Pt's wife, Lisa, is at the bedside with him and she was made aware of this and voiced understanding of this. Went over Pt's discharge instructions with Lisa and she voiced understanding of them. All of her questions were answered. She stated Pt has an appointment scheduled with his new PCP, Dr. Christina Ramirez, on Tuesday 2/11/2025 and then he will start to have in home physcial therapy. Pt's condition is currently stable and he appears to be in no acute distress. He is alert & oriented with ongoing intermittent confusion. His respirations are easy & unlabored on room air and his skin is pink, warm, and dry. He states he is currently pain free. Assisted Pt with getting dressing into his own clothes and getting his shoes on. He ate most of his lunch tray and then was assisted into a wheel chair and escorted out to their daughter's car per this RN. Pt had all of his belongings with him when he was discharged to go home with his wife.

## 2025-02-06 NOTE — PLAN OF CARE
Problem: Safety - Adult  Goal: Free from fall injury  2/6/2025 1233 by Geetha Tang RN  Outcome: Completed  2/6/2025 1232 by Geetha Tang RN  Outcome: Adequate for Discharge  2/6/2025 0737 by Geetha Tang RN  Outcome: Progressing  2/6/2025 0643 by Yeni Stevenson RN  Outcome: Progressing     Problem: Respiratory - Adult  Goal: Achieves optimal ventilation and oxygenation  2/6/2025 1233 by Geetha Tang RN  Outcome: Completed  2/6/2025 1232 by Geetha Tang RN  Outcome: Adequate for Discharge  Flowsheets (Taken 2/6/2025 0821)  Achieves optimal ventilation and oxygenation:   Assess for changes in mentation and behavior   Position to facilitate oxygenation and minimize respiratory effort   Assess for changes in respiratory status   Oxygen supplementation based on oxygen saturation or arterial blood gases   Encourage broncho-pulmonary hygiene including cough, deep breathe, incentive spirometry   Assess and instruct to report shortness of breath or any respiratory difficulty   Respiratory therapy support as indicated  2/6/2025 0737 by Geetha Tang RN  Outcome: Progressing  2/6/2025 0643 by Yeni Stevenson RN  Outcome: Progressing     Problem: ABCDS Injury Assessment  Goal: Absence of physical injury  2/6/2025 1233 by Geetha Tang RN  Outcome: Completed  2/6/2025 1232 by Geetha Tang RN  Outcome: Adequate for Discharge  2/6/2025 0737 by Geetha Tang RN  Outcome: Progressing  2/6/2025 0643 by Yeni Stevenson RN  Outcome: Progressing     Problem: Pain  Goal: Verbalizes/displays adequate comfort level or baseline comfort level  2/6/2025 1233 by Geetha Tang RN  Outcome: Completed  2/6/2025 1232 by Geetha Tang RN  Outcome: Adequate for Discharge  2/6/2025 0737 by Geetha Tang RN  Outcome: Progressing  2/6/2025 0643 by Yeni Stevenson RN  Outcome: Progressing     Problem: Chronic Conditions and Co-morbidities  Goal: Patient's chronic

## 2025-02-06 NOTE — DISCHARGE SUMMARY
V2.0  Discharge Summary    Name:  Man Harris /Age/Sex: 1940 (84 y.o. male)   Admit Date: 2/3/2025  Discharge Date: 25    MRN & CSN:  [id number] & [id number] Encounter Date and Time 25 12:06 PM EST    Attending:  Rubio Ramirez MD Discharging Provider: Francy Gibson APRN - CNP       Hospital Course:     Brief HPI: Man Harris is a 84 y.o. male who presented with Pneumonia     Brief Problem Based Course:     Acute respiratory failure with hypoxia, resolved  Influenza A, patient has been started on Tamiflu, continue on discharge to complete course  Multifocal pneumonia, suspected  Requiring supplemental oxygen on admission to maintain respiratory status.  Now stable on room air  CXR: Patchy alveolar opacity seen in the lungs as described. Atelectasis or pneumonia would be a consideration in the correct clinical setting  MRSA screening swab negative, negative urine antigens  Continue Tamiflu  Empiric Rocephin/doxycycline; continue on discharge for total 5-day course  PRN symptom control   Bronchodilators   CM to assist with HHC.  PCP recently retired and pending initial appointment to establish with Dr. Christina Ramirez. HHC to start afterwards  Hypomagnesemia  Follow-up with PCP for continued monitoring  Hypertension  Continue lotrel on discharge  Dementia,   Continue Exelon/trazodone on discharge  BPH,   Continue Flomax on discharge    The patient expressed appropriate understanding of, and agreement with the discharge recommendations, medications, and plan.     Consults this admission:  IP CONSULT TO HOME CARE NEEDS    Discharge Diagnosis:   Pneumonia, unspecified organism    Discharge Instruction:   Follow up appointments: None  Primary care physician: Orlando Ramirez MD within 2 weeks  Diet: regular diet   Activity: activity as tolerated  Disposition: Discharged to:   [x]Home, [x]HHC, []SNF, []Acute Rehab, []Hospice   Condition on discharge: Stable  Labs and Tests to be Followed up as an

## 2025-02-06 NOTE — CARE COORDINATION
CM faxed the discharge summary and discharge instructions to [facility].  CM also spoke with Livia at [facility] to notify them of the patient's discharge.  CM available if needs arise.

## 2025-02-06 NOTE — PROGRESS NOTES
I did NOT see the patient. The patient was discussed with the BESSY. I was available for questions and consultation as needed.       Plan for home.

## 2025-02-10 ENCOUNTER — TELEPHONE (OUTPATIENT)
Age: 85
End: 2025-02-10

## 2025-02-10 NOTE — TELEPHONE ENCOUNTER
Care Transitions Initial Follow Up Call    Outreach made within 2 business days of discharge: Yes    Patient: Man Harris Patient : 1940   MRN: [id number]  Reason for Admission: influenza A  Discharge Date: 25       Spoke with: patient's wife    [facility]    TCM Interactive Patient Contact:  Was patient able to fill all prescriptions: Yes  Was patient instructed to bring all medications to the follow-up visit: Yes  Is patient taking all medications as directed in the discharge summary? Yes  Does patient understand their discharge instructions: Yes  Does patient have questions or concerns that need addressed prior to 7-14 day follow up office visit: no    Additional needs identified to be addressed with provider  Patient has new patient appointment on 2025             Scheduled appointment with PCP within 7-14 days    Follow Up  Future Appointments   Date Time Provider Department Center   2025 11:30 AM Orlando Ramirez MD [facility]       Treva Dominique MA

## 2025-02-11 ENCOUNTER — TELEPHONE (OUTPATIENT)
Age: 85
End: 2025-02-11

## 2025-02-11 ENCOUNTER — OFFICE VISIT (OUTPATIENT)
Age: 85
End: 2025-02-11
Payer: MEDICARE

## 2025-02-11 VITALS
BODY MASS INDEX: 21.97 KG/M2 | DIASTOLIC BLOOD PRESSURE: 60 MMHG | OXYGEN SATURATION: 98 % | RESPIRATION RATE: 16 BRPM | HEIGHT: 67 IN | HEART RATE: 86 BPM | SYSTOLIC BLOOD PRESSURE: 110 MMHG | WEIGHT: 140 LBS

## 2025-02-11 DIAGNOSIS — K21.9 GASTROESOPHAGEAL REFLUX DISEASE WITHOUT ESOPHAGITIS: ICD-10-CM

## 2025-02-11 DIAGNOSIS — F51.01 PRIMARY INSOMNIA: ICD-10-CM

## 2025-02-11 DIAGNOSIS — I10 ESSENTIAL HYPERTENSION: ICD-10-CM

## 2025-02-11 DIAGNOSIS — J18.9 PNEUMONIA DUE TO INFECTIOUS ORGANISM, UNSPECIFIED LATERALITY, UNSPECIFIED PART OF LUNG: Primary | ICD-10-CM

## 2025-02-11 DIAGNOSIS — G31.84 MCI (MILD COGNITIVE IMPAIRMENT): ICD-10-CM

## 2025-02-11 DIAGNOSIS — Z91.81 AT HIGH RISK FOR FALLS: ICD-10-CM

## 2025-02-11 DIAGNOSIS — R53.81 DEBILITY: ICD-10-CM

## 2025-02-11 PROCEDURE — G8420 CALC BMI NORM PARAMETERS: HCPCS | Performed by: INTERNAL MEDICINE

## 2025-02-11 PROCEDURE — 1159F MED LIST DOCD IN RCRD: CPT | Performed by: INTERNAL MEDICINE

## 2025-02-11 PROCEDURE — 1111F DSCHRG MED/CURRENT MED MERGE: CPT | Performed by: INTERNAL MEDICINE

## 2025-02-11 PROCEDURE — 1036F TOBACCO NON-USER: CPT | Performed by: INTERNAL MEDICINE

## 2025-02-11 PROCEDURE — G8427 DOCREV CUR MEDS BY ELIG CLIN: HCPCS | Performed by: INTERNAL MEDICINE

## 2025-02-11 PROCEDURE — 1124F ACP DISCUSS-NO DSCNMKR DOCD: CPT | Performed by: INTERNAL MEDICINE

## 2025-02-11 PROCEDURE — 99203 OFFICE O/P NEW LOW 30 MIN: CPT | Performed by: INTERNAL MEDICINE

## 2025-02-11 ASSESSMENT — PATIENT HEALTH QUESTIONNAIRE - PHQ9
SUM OF ALL RESPONSES TO PHQ QUESTIONS 1-9: 0
2. FEELING DOWN, DEPRESSED OR HOPELESS: NOT AT ALL
SUM OF ALL RESPONSES TO PHQ QUESTIONS 1-9: 0
SUM OF ALL RESPONSES TO PHQ QUESTIONS 1-9: 0
1. LITTLE INTEREST OR PLEASURE IN DOING THINGS: NOT AT ALL
SUM OF ALL RESPONSES TO PHQ9 QUESTIONS 1 & 2: 0
SUM OF ALL RESPONSES TO PHQ QUESTIONS 1-9: 0

## 2025-02-11 NOTE — PROGRESS NOTES
Man Harris  Patient's  is 1940  Seen in office on 2025      SUBJECTIVE:  Man cid 84 y.o.year old male presents today   Chief Complaint   Patient presents with    New Patient     Patient was just released from hospital on 2025     New patient    Was admitted to the hospital with inf A and pneumonia.  He was released on 2025.  He is feeling much better.  He finished taking Tamiflu.  Patient denies any shortness of breath.  He is breathing much better      Patient has hypertension. Taking medications No headaches, no chest pain, no palpitations and no dizziness.    Memory issues seen Shereen Tanner and is on Rivastigmine    Patient has insomnia and is on trazodone 50 mg daily.  He is benefiting from it    GERD on pepcid that is controlled his symptoms      Taking medications regularly. No side effects noted.    Review of Systems  Review of system is normal except as in HPI    OBJECTIVE: /60 (Site: Left Upper Arm, Position: Sitting, Cuff Size: Medium Adult)   Pulse 86   Resp 16   Ht 1.702 m (5' 7\")   Wt 63.5 kg (140 lb)   SpO2 98%   BMI 21.93 kg/m²     Wt Readings from Last 3 Encounters:   25 63.5 kg (140 lb)   25 62.3 kg (137 lb 6.4 oz)   23 77.1 kg (170 lb)      GENERAL: - Alert, oriented, pleasant, in no apparent distress.    HEENT: - Conjunctiva pink, no scleral icterus. ENT clear.  NECK: -Supple.  No jugular venous distention noted. No masses felt,  CARDIOVASCULAR: - Normal S1 and S2    PULMONARY: - No respiratory distress.  No wheezes or rales.    ABDOMEN: - Soft and non-tender,no masses  ororganomegaly.  EXTREMITIES: - No cyanosis, clubbing, or significant edema.  SKIN: Skin is warm and dry.   NEUROLOGICAL: - Cranial nerves II through XII are grossly intact.      IMPRESSION:    Encounter Diagnoses   Name Primary?    Pneumonia due to infectious organism, unspecified laterality, unspecified part of lung Yes    Primary insomnia     Gastroesophageal reflux

## 2025-02-11 NOTE — TELEPHONE ENCOUNTER
Inform patient about that.  If she has any other home health care company she knows I can refer there.

## 2025-02-11 NOTE — TELEPHONE ENCOUNTER
Lyn with [facility] called stating they do not have any therapists in the [place] area at this time so they will not be able to fulfill the referral     Please advise

## 2025-02-12 ENCOUNTER — TELEPHONE (OUTPATIENT)
Age: 85
End: 2025-02-12

## 2025-02-12 NOTE — TELEPHONE ENCOUNTER
Sandee from [facility] called and wants to know if Dr. Ramirez is \"willing to follow for Home healthcare orders\" for this patient. Please contact her at [phone].

## 2025-02-12 NOTE — TELEPHONE ENCOUNTER
Verbal okay given to [facility] that Dr. Ramirez will follow - paperwork faxed to Sandee [phone]

## 2025-02-12 NOTE — TELEPHONE ENCOUNTER
Faxed referral to [facility] seen message from [facility] - cancelled referral to [facility]

## 2025-02-14 ENCOUNTER — TELEPHONE (OUTPATIENT)
Age: 85
End: 2025-02-14

## 2025-02-14 NOTE — TELEPHONE ENCOUNTER
[facility] called to inform provider patient has been admitted to nursing, physical, and occupational therapy.     Also stated that patient's Vitamin B 12, Tylenol, and Nasal Spray are missing on the medication list that was sent to them.

## 2025-02-17 NOTE — TELEPHONE ENCOUNTER
Patient identified by wife and informed states that he is feeling better and will get cxr as soon as weather clears.

## 2025-02-18 ENCOUNTER — TELEPHONE (OUTPATIENT)
Age: 85
End: 2025-02-18

## 2025-02-18 NOTE — TELEPHONE ENCOUNTER
Yeni from [facility] called to inform the Doctor that Man was seen for his OT evaluation today.

## 2025-02-24 ENCOUNTER — HOSPITAL ENCOUNTER (OUTPATIENT)
Dept: GENERAL RADIOLOGY | Age: 85
Discharge: HOME | End: 2025-02-24
Payer: MEDICARE

## 2025-02-24 ENCOUNTER — HOSPITAL ENCOUNTER (OUTPATIENT)
Age: 85
Discharge: HOME | End: 2025-02-24
Payer: MEDICARE

## 2025-02-24 DIAGNOSIS — J18.9 PNEUMONIA DUE TO INFECTIOUS ORGANISM, UNSPECIFIED LATERALITY, UNSPECIFIED PART OF LUNG: ICD-10-CM

## 2025-02-24 PROCEDURE — 71046 X-RAY EXAM CHEST 2 VIEWS: CPT

## 2025-02-26 ENCOUNTER — TELEPHONE (OUTPATIENT)
Age: 85
End: 2025-02-26

## 2025-02-26 NOTE — TELEPHONE ENCOUNTER
Patient: Adonis Watkins Date: 2/3/2025   : 1948    76 year old male      OUTPATIENT WOUND CARE PROGRESS NOTE    Supervising Wound Care / Hyperbaric Medicine Physician: Avila Kelley MD  Consulting Provider:  Avila Kelley MD  Date of Consultation/Last Comprehensive Exam: 4-3-2024  Referring  Provider:  Margaret Rios MD    SUBJECTIVE:    Chief Complaint: Open surgical wound      History of Present Illness:      Pt presents to the wound clinic today for initial assessment and treatment of midline abdominal non healing surgical wound.  He is accompanied by his spouse who assists with wound care at home. Pt reports he has [facility]  nurse who visits 2 times weekly for dressing change. Pt had abdominal surgery, right hemicolectomy bowel resection on 2024 due to malignant small bowel tumor by Margaret Rios MD. The incision dehisced on 2024.  Pt reports He is currently taking oral Augmentin twice daily until 2024; and the wound has been dressed with dry gauzes twice daily.   Positive drainage no foul odor no periwound cellulitis.      Update 2024 RZ patient looking better.  Patient excisionally debrided today.  Wound VAC ordered for next week packing with Dakin's quarter strength wet-to-dry.       Update 4- RZ patient looking better.  Patient excisionally debrided today.  Wound VAC ordered, but still not approved by insuance for next week packing with Dakin's quarter strength wet-to-dry.    Update 2024 RZ patient looking better. The pt's wound vac is approved. Pt excisionally debrided today and cauterized. Wound VAC applied, black foam 125 mmHg continuous change every other day., protect sutured areas and mesh in the wound with silicone      Update 2024 RZ.  Patient continues to improve.  The patient was excisionally debrided today.  No change to wound orders.  The wound is less deep today, continue with the wound VAC      5-3-2024 RZ.  Patient here early  Yeni from [facility] called to inform Dr. Ramirez that patient has been discharged from OT services.   for evaluation of possible infection in his wound.  The patient's home health nurse told patient's wife that there was slough in the wound.  No fever no chills.  No foul odor, normal drainage into the canister.  No clinical evidence of infection today, the patient was excisionally debrided today.  No changes to wound orders.      5- RZ. Wound is bigger this week. The pt has a lot of mesh and sutures that are impeding wound healing. Continue the wound vac, I am going to discontinue home health as I want my wound care nurses changing the dressings.  The patient was excisionally debrided today, return to department next week.  We are running for EpiFix and Epicord, but there appears to be several more thousand dollars before the patient meets his out of pocket max.      5- RZ.  Patient's superior portion of his wound is little bit indurated.  He states that the VAC is not working properly.  I am switching back to just black foam.  I sent a culture of the slightly hyper granulated area in the middle of the superior part of the wound.  I am starting the patient empirically Cipro and doxycycline.  There is some periwound redness..    Return to the department 1 week      6-4-2024 RZ  Pt is currently on augmentin the pt has some diarrhea but the wound is looking better. Pt was excisionally debrided today. Hold vac, switching back to dakins wet to dry for his fishing trip. Labs are good but creatinine a bit higher patient advised to hydrate CT no fistula:         Study Result    Narrative & Impression   EXAM: CT ABDOMEN PELVIS W CONTRAST     CLINICAL INDICATION: Anterior abdominal wall nonhealing wound.     COMPARISON: 3/17/2024     TECHNIQUE: Spiral CT of the abdomen and pelvis performed after rapid IV  bolus of 100 cc of Omnipaque 350 contrast via left antecubital access.  Reconstructed axial, coronal and sagittal images are obtained and  interpreted. Oral contrast has not been administered. Automated  dose  lowering techniques were utilized.     FINDINGS:     ABDOMEN:     LUNG BASES: Negative  LIVER: Hepatic steatosis.  BILIARY SYSTEM: Negative  GALLBLADDER: Negative  PANCREAS: Negative  SPLEEN: Negative  ADRENAL GLANDS: Negative  RT KIDNEY: Negative  LT KIDNEY: Negative  STOMACH/SMALL BOWEL/COLON: Nonobstructive bowel gas pattern.  RETROPERITONEUM: Appendix is surgically absent. Uncomplicated colonic  diverticulosis.  MESENTERY/PERITONEAL SPACE: Negative  VASCULAR STRUCTURES: Negative  ABDOMINAL WALL: Open wound in the region of the umbilicus with increased  skin and adjacent subcutaneous thickening. There are multiple loops of  bowel abutting the anterior abdominal pleural reflection at the wound. Also  noted is increased omental stranding right anterior abdomen without focal  drainable fluid collection.     PELVIS:     BLADDER: Negative  PROSTATE GLAND: Prostatomegaly     MUSCULOSKELETAL: Multilevel degenerative disc disease most notable L2-L3  and L5-S1.     IMPRESSION:  1. Increasing inflammation and stranding adjacent to an anterior abdominal  wall wound. There is also increased intra-abdominal omental fat stranding  right mid abdomen. No focal drainable abscess.  2. Multiple bowel loops abut the anterior peritoneal reflection at the  level of the wound. Cannot exclude enterocutaneous fistula formation.  3. Hepatic steatosis.     Electronically Signed by: LEATHA LABOY M.D.   Signed on: 6/1/2024 4:00 PM   Workstation ID: [id number]       6- RZ.  The patient is doing well.  His wound is smaller.  It is changing shape.  He seems to be doing very well with the wet-to-dry Dakin's.  Going to continue with that.  Patient is finishing up his oral antibiotics.        6- RZ  The patient is doing well.  His wound is smaller.  It is changing shape.  He seems to be doing very well with the wet-to-dry Dakin's. Still a little bit of yellow drainage this week. Continue with the Dakins. Pt was excisionally  debrided today. Hold wound vac for another week.    6-.  Patient's wound looks good.  It is smaller.  Staying with a quarter strength Dakin's wet-to-dry.  We can discontinue the VAC.  I re-read Dr. Hanson's note.  The Patient is supposed to get follow-up with his neuroendocrine tumor with  repeat CT PET scan end of September.  Otherwise just surveillance for his neuroendocrine tumor.  The wound is healing nicely.   I will have the patient see Dr. Graves next week for wound debridement and I  will be back in the office on July 8.      7/2/2024 patient seen and examined, he denies new complaints, he is using Dakin's to his abdominal wound.    7-8-2024 RZ patient seen and examined, he denies new complaints, he is using Dakin's to his abdominal wound. The wound is hypergranulated today and the wound was knocked down using silver nitrate.  No change to wound orders return next week.      7- RZ patient seen and examined, he denies new complaints, he is using Dakin's to his abdominal wound. The pt was excisionally debrided today and cauterized. No change to wound orders return next week.  The patient also states that he is using his abdominal binder.      Update 7- RZ.  The patient was excisionally debrided and cauterized today.  Switching to Aquacel Ag and gauze and ABD, change every other day, return to department in a week      Update 7- RZ.  Patient has been approved for EpiFix.  We asked the EpiFix rep, he believes that there will be little if any co-pay if we applied.  Patient was excisionally debrided today and cauterized today so today is not a good day to apply the EpiFix.  Demond MILLER is going to be covering for me next week.  If he can apply the EpiFix at that time that would be perfect.    Update 8- RZ.  I am pulling pieces of suture and hernia mesh out of the patient's wounds today.  The wounds are looking much worse.  I notified Dr. Rios.  The patient is going to  follow-up with him on Friday.  Also did notify Dr. Sharp that the patient is going to be following up with Dr. Rios.  Return after general surgery evaluation with Dr. Rios.    Update 8- RZ.  Patient is scheduled for surgery with Dr. Rios on 8-.  Going to treat with Dakin's in the meantime.  The patient can follow-up after surgery.        Update 9-     The patient returns to the wound center today.  He is status post  surgery with Dr. Rios:        OP Notes     Op Note by Margaret Rios MD at 8/28/2024 10:36 AM    Author: Margaret Rios MD Author Type: Physician Filed: 8/28/2024  1:08 PM   Note Status: Signed Cosign: Cosign Not Required Date of Service: 8/28/2024 10:36 AM   : Margaret Rios MD (Physician)               Operative Note     Patient: Adonis Watkins 75 year old male     MRN: [id number]     Surgeon(s): Margaret Rios MD  Phone Number: [phone]                       Surgeons and Role:     * Margaret Rios MD - Primary    Assistant(s): RAYMON Lucas     Pre-Op Diagnosis: Non-healing surgical wound, initial encounter [T81.89XA]  Incisional hernia, without obstruction or gangrene [K43.2]  Wound dehiscence [T81.30XA]  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]  S/P small bowel resection [Z90.49]     Post-Op Diagnosis: same     Procedure:   (1) excision of previously implanted foreign body (hernia mesh)  (2) enterolysis  (3) resection of hernia sac  (4) primary closure of incisional hernia     Anesthesia Type: General                                               The pt went back to [facility] from 9-12 to 9-15-24     From Carolynn Chong MD  \"Hospital Course:  75 year old male with PMHx of  Asthma, A-fib, Colon adenocarcinoma, CAD, hypertension, GERD, Gout, Hyperlipidemia, Incisional hernia, Leukocytosis, Lung cancer, Small bowel cancer, Stroke presenting with bleeding from his midline incision on 9/5.  Patient has elevated lactate and leukocytosis of 14.5. lactate  normalized. His midline wound staples were removed and large amounts of clots and foul smelling discharge was expressed. The midline was left open. CT abdomen pelvis on 9/5 demonstrated a most likely fistulized connection into the midline. The wound was cultured and the bacteria were sensitive to cipro/flagyl. He has been doing wet to dry dressing changes. He will be discharged on antibiotics, wet to dry dressing changes. He will follow up with wound care and with Dr. Rios\".     The patient went back to the emergency department at [facility] to the [facility] in [place] from 9-13 to 9-15    The patient was admitted for hypotension after recent abdominal hernia or revision and washout with infection.  Patient was orthostatic had blood pressure medications held patient was seen by cardiology  and was asked to continue oral antibiotics and follow-up with general surgery as an outpatient    Pt to see Dr[facility] Ranj[facility] on 9[facility]19    Update 9- RZ.  Patient's abdomen is getting smaller.  I pulled out multiple retention sutures.  The patient was excisionally debrided today.  Going to continue with Vashe wet-to-dry then run patient's insurance for wound VAC.  Return in a week.      9- RZ.  Patient now on the Vashe wet-to-dry.  We will continue that for now.  I would like to start the patient on a wound VAC, but I am hesitant to do so with all of the retention sutures and the Gerber-Melchor drain in place.  Hopefully surgery will remove both of those so we can start a wound VAC    Update 9- RZ.  Patient's abdominal wound continues to improve.  The patient was excisionally debrided today.  Waiting on a wound VAC insurance approval.  Continuing with the Vashe wet-to-dry.  Return in a week.      10-7-2024 RZ.  Patient was excisionally debrided today.  Wound is looking better.  We have a wound VAC.  It was approved.  Starting wound VAC black foam 125 mmHg continuous change 3 times a week.  Physician visit  next week.      10-8-2024 RZ.  Patient started on VAC.  Physician visit next week.    Update 10- RZ.  The patient is doing much better.  Wound looks smaller.  Excellent granulation minimal tunneling or undermining.  Really no slough.  The VAC is really doing a nice job on healing the patient's wound.  We are going to continue with that.  The patient was excisionally debrided today.    Update 10- RZ.  Patient continues to improve.  The patient was excisionally debrided today.  No changes to wound orders.  Continue with the wound VAC.  Return for physician visit in 1 week.      Update 10- RZ  Patient continues to improve.  The patient was excisionally debrided today and cauterized.  No changes to wound orders.  Continue with the wound VAC. Going to run insurance for oasis epifix and epicord. Return next week.      Update 11-4-2024 RZ.  Patient continues to improve.  His wound is smaller.  We are running him for [place].  Patient was excisionally debrided today.  No change to wound orders.  RN visits until I see the patient in 2 weeks          Update 11- RZ.  Patient continues to improve.  His wound is smaller.  We ran him for Oasis (not covered), but I think epifix may be better. Pt approved for epicord but wound too shallow for epicord. I don't see that epifix was run last time. We are going to run the pt again for epifix and epicord.  Patient was excisionally debrided today and cauterized.  No change to wound orders.  Continuing with vac for one more week.       Update 11- RZ.  Patient continues to improve.  He is approved for EpiFix.  We applied epi fix mesh for by 4.5 cm billing units 11 after excisional debridement of the wound the patient tolerated the procedure well.  Return next week      Update 12-2-2024 RZ Patient continues to improve.  He is approved for EpiFix.  We applied epi fix mesh 4 by 4.5 cm, billing units 11 after excisional debridement of the wound the patient  tolerated the procedure well.  Return next week. Discontinue wound vac.    Update 12-9-2024 RZ.  Patient's wound is hyper granulated.  The patient was excisionally debrided today and cauterized.      Update 12-16-24 RZ Pt's wound is changing shape. Looking better with lots of new skin around the edges. The pt was excisionally debrided and cauterized. No changes to wound orders. Return in one week.      Update 1-6-2025.  RZ.  Patient is improving very nicely.  The patient was excisionally debrided today and cauterized as the wound was hyper granulated.       Update 1- RZ. Pt is improving the pt was excisionally debrided today and cauterized. No changes to wound orders. Return next week.    Update 1- RZ. Pt is improving the pt was excisionally debrided today and cauterized. No changes to wound orders. Return next week.      Update 1- RZ. The pt's wound is changing shape and is improving. The pt was excisionally debrided today and cauterized. No changes to wound orders. Return next week.      Update 2-4-2025 RZ.  Patient's wound is changing state.  The patient's wound is generally improving.  The wound still has some hypergranulation. The patient was excisionally debrided today and cauterized.  No changes to wound orders.  Follow-up with me next week.    Current Treatment Regimen:  Dressing: Urgotul Ag drawtex alginate ABD  and paper tape.  Changed by: Wound care nurse or home health    Review of Systems:  Positive for open abdominal wound all other systems are reviewed and are negative    Past Medical History:   Diagnosis Date    Abdominal hernia     Asthma (CMD)     Atrial fibrillation  (CMD)     BPH (benign prostatic hyperplasia)     Cervical spondylosis     right thumb and 1st finger numbness    CKD (chronic kidney disease)     Colon adenocarcinoma  (CMD)     Coronary artery disease     calcium score 599 on 4/2024    Dehiscence of incision     nonhealing wound after SB resection    Delayed  emergence from anesthesia     Essential (primary) hypertension     Gastroesophageal reflux disease     Gout     H/O dizziness (1st symptom of stroke)     Headache     Hematoma of left chest wall     Hypercholesterolemia     Hyperlipidemia     Incidental lung nodule     RLL    Incisional hernia     Leukocytosis     Lumbar stenosis with neurogenic claudication     Lung cancer  (CMD)     lobectomy    Malignant neoplasm of upper lobe, left bronchus or lung  (CMD) 03/10/2016    Neuroendocrine carcinoma  (CMD)     Periodic limb movements of sleep     Small bowel cancer  (CMD) 2024    Stroke (cerebrum)  (CMD)     no residual    Unstable balance (occasionally)      Past Surgical History:   Procedure Laterality Date    Appendectomy      Back surgery      L3    Cervical fusion      c3-7    Cyst removal  2020    back    Esophagogastroduodenoscopy transoral flex diag      Extracapsular cataract removal w insert io lens prosth wo ecp Right     Hemorrhoidectomy      Hernia repair      Umbilical hernia surgery (surgery 3 times)    Hernia repair  2024    OPEN REPAIR VENTRAL INCISIONAL HERNIA , Dr Rios @ [facility]    Laminectomy and microdiscectomy lumbar spine      lumbar 4-5    Lung removal, partial Left 02/10/2016    top left lobe     Open access colonoscopy      Small intestine surgery Right 2024    exp lap, right hemicolectomy    Tonsillectomy       Social History     Socioeconomic History    Marital status: /Civil Union     Spouse name: Not on file    Number of children: Not on file    Years of education: Not on file    Highest education level: Not on file   Occupational History    Not on file   Tobacco Use    Smoking status: Former     Current packs/day: 0.00     Average packs/day: 1 pack/day for 30.0 years (30.0 ttl pk-yrs)     Types: Cigarettes     Quit date: 1994     Years since quittin.0     Passive exposure: Past    Smokeless tobacco: Never    Tobacco comments:     Quit in     Vaping Use    Vaping status: never used   Substance and Sexual Activity    Alcohol use: Not Currently    Drug use: Never    Sexual activity: Not Currently   Other Topics Concern    Not on file   Social History Narrative    Not on file     Social Determinants of Health     Financial Resource Strain: Low Risk  (9/16/2024)    Financial Resource Strain     Unable to Get: None   Food Insecurity: Low Risk  (9/16/2024)    Food Insecurity     Worried about Food: Never true     Food is Gone: Never true   Transportation Needs: Not At Risk (9/16/2024)    Transportation Needs     Lack of Reliable Transportation: No   Physical Activity: High Risk (9/16/2024)    Exercise Vital Sign     Days of Exercise per Week: 0 days     Minutes of Exercise per Session: 0 min   Stress: Low Risk  (9/16/2024)    Stress     How Stressed: Not at all   Social Connections: Low Risk  (9/16/2024)    Social Connections     Social Connectivity: 5 or more times a week   Interpersonal Safety: Low Risk  (9/16/2024)    Interpersonal Safety     How often physically hurt: Never     How often insulted or talked down to: Never     How often threatened with harm: Never     How often scream or curse at: Never     Family History   Problem Relation Age of Onset    Other Mother         cerebral artery occlusion with cerbral infarction    Stroke Mother     Other Father         emphysema lung       Current Outpatient Medications   Medication Sig    naLOXone (NARCAN) 4 MG/0.1ML nasal liquid Spray the content of 1 device into 1 nostril. Call 911. May repeat with 2nd device in alternate nostril if no response in 2-3 minutes.    HYDROcodone-acetaminophen (NORCO) 5-325 MG per tablet Take 1 tablet by mouth every 6 hours as needed for Pain.    gabapentin (NEURONTIN) 100 MG capsule Take 1 capsule by mouth in the morning and 1 capsule at noon and 1 capsule in the evening. Do all this for 14 days. (Patient not taking: Reported on 9/19/2024)    FIBER PO Take 1 tablet by mouth  daily.    celecoxib (CeleBREX) 200 MG capsule Take 200 mg by mouth in the morning and 200 mg in the evening.    Apoaequorin (Prevagen) 10 MG Cap Take 10 mg by mouth daily. Indications: memory    Lutein-Zeaxanthin 25-5 MG Cap Take 1 capsule by mouth daily.    fenofibric acid 135 MG delayed-release capsule Take 135 mg by mouth daily.    albuterol 108 (90 Base) MCG/ACT inhaler Inhale 2 puffs into the lungs every 4 hours as needed for Shortness of Breath or Wheezing. Indications: SOB Takes when he has a bad cold    testosterone cypionate (DEPO-TESTOSTERONE) 200 MG/ML injectable solution Inject 200 mg into the muscle every 14 days. Every other Tuesday    esomeprazole (NexIUM) 20 MG capsule Take 20 mg by mouth daily (before breakfast).    POTASSIUM CHLORIDE PO Take 99 mg by mouth every morning.    ezetimibe (ZETIA) 10 MG tablet Take 10 mg by mouth every morning.    tamsulosin (FLOMAX) 0.4 MG Cap Take 0.4 mg by mouth in the morning and 0.4 mg in the evening.    levothyroxine (SYNTHROID, LEVOTHROID) 100 MCG tablet Take 100 mcg by mouth every morning.    allopurinol (ZYLOPRIM) 300 MG tablet Take 300 mg by mouth every morning.    nebivolol (BYSTOLIC) 10 MG tablet Take 10 mg by mouth every morning.    Multiple Vitamins-Minerals (CENTRUM) tablet Take 1 tablet by mouth every morning.    apixaBAN (ELIQUIS) 5 MG Tab Take 5 mg by mouth in the morning and 5 mg in the evening. Indications: Cerebrovascular accident secondary to Atrial Fibrillation.    Ascorbic Acid (vitamin C) 1000 MG tablet Take 1,000 mg by mouth every morning.     No current facility-administered medications for this encounter.        ALLERGIES:  Celebrex [celecoxib], Digoxin, Adhesive   (environmental), Hibiclens, and Wound dressing adhesive    OBJECTIVE:  Vital Signs:  Visit Vitals  BP (!) 168/83 (BP Location: LUE - Left upper extremity, Patient Position: Sitting)   Pulse 74   Temp 97.9 °F (36.6 °C) (Oral)   Resp 16   Wt 88.5 kg (195 lb)   BMI 30.54 kg/m²            Physical Exam:    General appearance: Alert, well-developed, well-nourished and in no distress  Head:   normocephalic without obvious abnormality  Pulmonary: normal respiratory effort  Cardiac: Heart:  regular rhythm  Abdomen: soft, non-tender, open abdominal surgical wound  Nice granulation, no warmth no purulent discharge.   Neurologic:  alert moving all extremities no focal findings.  Skin: Open abdominal wound with + drainage no foul odor no periwound cellulitis.  Hyper granulated. + new epithelialization.          Wound Measurements Per Flowsheet:       Wound Abdomen Midline/Middle Surgical Wound (Active)   Wound Care Team Consult Date 04/03/24 04/03/24 1431   Wound Length (cm) 4.8 cm 02/03/25 0805   Wound Width (cm) 5 cm 02/03/25 0805   Wound Depth (cm) 0.1 cm 02/03/25 0805   Wound Surface Area (cm^2) 24 cm^2 02/03/25 0805   Wound Volume (cm^3) 2.4 cm^3 02/03/25 0805   Wound Volume Change (Initial) -38.4 cm3 02/03/25 0805   Wound Volume % Change (Initial) -94.12 % 02/03/25 0805   Wound Volume Change (30 days) -0.05 cm3 02/03/25 0805   Wound Volume % Change (30 days) -2.04 % 02/03/25 0805   Number of days: 334         PROCEDURE:            Debridement: Excisional Debridement.  Informed consent obtained.  Location: Abdomen  Time out was completed.  Patient, procedure, and site verified.  Anesthesia-  Topical  Tool-  Curette  Size-  Less than or equal to 40 sq cm  Total debrided area was 24 sq cm   Tissue Type Excised-  Subcutaneous  Hemostasis achieved-  Cautery  Patient procedure-  tolerated well, no complications.  Patient stable upon completion of procedure.  Wound dimensions unchanged post procedure.      Procedure performed by: Physician                  Laboratory assessments reviewed:  No results found for: \"PAB\"   Albumin (g/dL)   Date Value   09/23/2024 3.4   09/15/2024 2.7 (L)   09/14/2024 2.6 (L)      No results available in last 24 hours    Lab Results   Component Value Date    WBC 7.5 09/23/2024     GLUCOSE 99 09/23/2024    HGBA1C 5.3 03/12/2024    CRP 6.9 05/31/2024    RESR 1 01/13/2016    CREATININE 1.03 09/23/2024    GFRA >90 12/31/2019    GFRNA 81 12/31/2019        Culture results:  No results found for: \"SDES\" No results found for: \"CULT\"     Diagnostic Assessments Reviewed:    The patient's medical records in epic were reviewed      Nutritional Assessment:    I agree with the nursing nutritional assessment in the flowsheet    Wound treatment goals are palliative:  No    DIAGNOSES:  Nonhealing surgical wound subsequent encounter T81.89 XD    Medical Decision Making:     The patient's medical records in epic were reviewed      Plan of Care:    Urgotul AG alginate drawtex and abd     Serial debridements and cautery.      Follow up with me next week.          Thank you for the consultation

## 2025-03-11 ENCOUNTER — RESULTS FOLLOW-UP (OUTPATIENT)
Dept: GENERAL RADIOLOGY | Age: 85
End: 2025-03-11

## 2025-03-13 ENCOUNTER — OFFICE VISIT (OUTPATIENT)
Age: 85
End: 2025-03-13
Payer: MEDICARE

## 2025-03-13 VITALS
HEART RATE: 76 BPM | TEMPERATURE: 98 F | BODY MASS INDEX: 23.05 KG/M2 | WEIGHT: 147.2 LBS | OXYGEN SATURATION: 100 % | DIASTOLIC BLOOD PRESSURE: 62 MMHG | RESPIRATION RATE: 16 BRPM | SYSTOLIC BLOOD PRESSURE: 118 MMHG

## 2025-03-13 DIAGNOSIS — K21.9 GASTROESOPHAGEAL REFLUX DISEASE WITHOUT ESOPHAGITIS: ICD-10-CM

## 2025-03-13 DIAGNOSIS — J18.9 PNEUMONIA DUE TO INFECTIOUS ORGANISM, UNSPECIFIED LATERALITY, UNSPECIFIED PART OF LUNG: Primary | ICD-10-CM

## 2025-03-13 DIAGNOSIS — R53.81 DEBILITY: ICD-10-CM

## 2025-03-13 PROCEDURE — 99213 OFFICE O/P EST LOW 20 MIN: CPT | Performed by: INTERNAL MEDICINE

## 2025-03-13 PROCEDURE — G8420 CALC BMI NORM PARAMETERS: HCPCS | Performed by: INTERNAL MEDICINE

## 2025-03-13 PROCEDURE — G8427 DOCREV CUR MEDS BY ELIG CLIN: HCPCS | Performed by: INTERNAL MEDICINE

## 2025-03-13 PROCEDURE — 1036F TOBACCO NON-USER: CPT | Performed by: INTERNAL MEDICINE

## 2025-03-13 PROCEDURE — 1159F MED LIST DOCD IN RCRD: CPT | Performed by: INTERNAL MEDICINE

## 2025-03-13 PROCEDURE — 1124F ACP DISCUSS-NO DSCNMKR DOCD: CPT | Performed by: INTERNAL MEDICINE

## 2025-03-13 RX ORDER — RIVASTIGMINE TARTRATE 6 MG/1
6 CAPSULE ORAL 2 TIMES DAILY
COMMUNITY
Start: 2025-03-04

## 2025-03-13 RX ORDER — FAMOTIDINE 20 MG/1
20 TABLET, FILM COATED ORAL 2 TIMES DAILY
Qty: 60 TABLET | Refills: 5 | Status: SHIPPED | OUTPATIENT
Start: 2025-03-13

## 2025-03-13 RX ORDER — ACETAMINOPHEN 500 MG
500 TABLET ORAL NIGHTLY
COMMUNITY

## 2025-03-13 RX ORDER — IPRATROPIUM BROMIDE 42 UG/1
1 SPRAY, METERED NASAL PRN
COMMUNITY
Start: 2025-01-17

## 2025-03-13 NOTE — PROGRESS NOTES
130 02/17/2021    CHOLFAST 139 02/17/2021     Renal:   Lab Results   Component Value Date/Time    BUN 30 02/06/2025 04:47 AM    CREATININE 0.9 02/06/2025 04:47 AM     02/06/2025 04:47 AM    K 3.9 02/06/2025 04:47 AM    ALKPHOS 72 01/11/2024 02:45 AM    ALT 14 01/11/2024 02:45 AM    AST 19 01/11/2024 02:45 AM    GLUCOSE 83 02/06/2025 04:47 AM    GLUF 86 10/25/2022 12:30 PM     PT/INR:   Lab Results   Component Value Date/Time    INR 0.93 11/18/2022 01:15 PM     A1C: No results found for: \"LABA1C\"        Orlando Ramirez MD, 3/13/2025 , 12:21 PM

## 2025-04-28 RX ORDER — AMLODIPINE AND BENAZEPRIL HYDROCHLORIDE 5; 10 MG/1; MG/1
1 CAPSULE ORAL DAILY
Qty: 90 CAPSULE | Refills: 0 | Status: SHIPPED | OUTPATIENT
Start: 2025-04-28

## 2025-04-28 NOTE — TELEPHONE ENCOUNTER
Patients spouse contacted office asking for a refill on his Lotrel 5-10mg once per day, they did not think to ask for this on their visit last month.  This was a medication that Dr. Najera had been prescribing.    Patient uses [facility] in [place].  They would like a 90 day script if possible.  Next apt is 6/16/2025

## 2025-07-29 RX ORDER — AMLODIPINE AND BENAZEPRIL HYDROCHLORIDE 5; 10 MG/1; MG/1
1 CAPSULE ORAL DAILY
Qty: 90 CAPSULE | Refills: 5 | Status: SHIPPED | OUTPATIENT
Start: 2025-07-29

## 2025-07-29 NOTE — TELEPHONE ENCOUNTER
Patient needs 3 mo follow up per last office visit. LMOVM for patient to call the office to schedule appointment.

## 2025-08-06 LAB
ALBUMIN: 4.5 G/DL
ALP BLD-CCNC: 70 U/L
ALT SERPL-CCNC: NORMAL U/L
ANION GAP SERPL CALCULATED.3IONS-SCNC: 9 MMOL/L
AST SERPL-CCNC: 23 U/L
BILIRUB SERPL-MCNC: 0.7 MG/DL (ref 0.1–1.4)
BUN BLDV-MCNC: 45 MG/DL
CALCIUM SERPL-MCNC: 10.5 MG/DL
CHLORIDE BLD-SCNC: 113 MMOL/L
CO2: 23 MMOL/L
CREAT SERPL-MCNC: 1.21 MG/DL
GFR, ESTIMATED: 58
GLUCOSE BLD-MCNC: 101 MG/DL
POTASSIUM SERPL-SCNC: 4 MMOL/L
SODIUM BLD-SCNC: 145 MMOL/L
TOTAL PROTEIN: 6.9 G/DL (ref 6.4–8.2)

## 2025-08-07 ENCOUNTER — OFFICE VISIT (OUTPATIENT)
Age: 85
End: 2025-08-07

## 2025-08-07 ENCOUNTER — OFFICE VISIT (OUTPATIENT)
Age: 85
End: 2025-08-07
Payer: MEDICARE

## 2025-08-07 VITALS
RESPIRATION RATE: 16 BRPM | TEMPERATURE: 98.3 F | HEART RATE: 76 BPM | SYSTOLIC BLOOD PRESSURE: 138 MMHG | WEIGHT: 141.8 LBS | BODY MASS INDEX: 22.21 KG/M2 | OXYGEN SATURATION: 97 % | DIASTOLIC BLOOD PRESSURE: 68 MMHG

## 2025-08-07 VITALS
HEIGHT: 67 IN | HEART RATE: 76 BPM | TEMPERATURE: 98.3 F | RESPIRATION RATE: 16 BRPM | OXYGEN SATURATION: 97 % | WEIGHT: 141 LBS | BODY MASS INDEX: 22.13 KG/M2 | DIASTOLIC BLOOD PRESSURE: 68 MMHG | SYSTOLIC BLOOD PRESSURE: 138 MMHG

## 2025-08-07 DIAGNOSIS — I10 ESSENTIAL HYPERTENSION: Primary | ICD-10-CM

## 2025-08-07 DIAGNOSIS — K21.9 GASTROESOPHAGEAL REFLUX DISEASE WITHOUT ESOPHAGITIS: ICD-10-CM

## 2025-08-07 DIAGNOSIS — Z00.00 MEDICARE ANNUAL WELLNESS VISIT, SUBSEQUENT: Primary | ICD-10-CM

## 2025-08-07 DIAGNOSIS — Q60.0 SOLITARY KIDNEY, CONGENITAL: ICD-10-CM

## 2025-08-07 DIAGNOSIS — I71.40 ABDOMINAL AORTIC ANEURYSM (AAA) WITHOUT RUPTURE, UNSPECIFIED PART: ICD-10-CM

## 2025-08-07 DIAGNOSIS — F51.01 PRIMARY INSOMNIA: ICD-10-CM

## 2025-08-07 DIAGNOSIS — G31.84 MCI (MILD COGNITIVE IMPAIRMENT): ICD-10-CM

## 2025-08-07 PROBLEM — J18.9 PNEUMONIA, UNSPECIFIED ORGANISM: Status: RESOLVED | Noted: 2025-02-03 | Resolved: 2025-08-07

## 2025-08-07 PROCEDURE — 99214 OFFICE O/P EST MOD 30 MIN: CPT | Performed by: INTERNAL MEDICINE

## 2025-08-07 PROCEDURE — G8420 CALC BMI NORM PARAMETERS: HCPCS | Performed by: INTERNAL MEDICINE

## 2025-08-07 PROCEDURE — 1036F TOBACCO NON-USER: CPT | Performed by: INTERNAL MEDICINE

## 2025-08-07 PROCEDURE — 1159F MED LIST DOCD IN RCRD: CPT | Performed by: INTERNAL MEDICINE

## 2025-08-07 PROCEDURE — G8427 DOCREV CUR MEDS BY ELIG CLIN: HCPCS | Performed by: INTERNAL MEDICINE

## 2025-08-07 PROCEDURE — G0439 PPPS, SUBSEQ VISIT: HCPCS | Performed by: INTERNAL MEDICINE

## 2025-08-07 PROCEDURE — 1124F ACP DISCUSS-NO DSCNMKR DOCD: CPT | Performed by: INTERNAL MEDICINE

## 2025-08-07 ASSESSMENT — PATIENT HEALTH QUESTIONNAIRE - PHQ9
SUM OF ALL RESPONSES TO PHQ QUESTIONS 1-9: 2
2. FEELING DOWN, DEPRESSED OR HOPELESS: SEVERAL DAYS
SUM OF ALL RESPONSES TO PHQ QUESTIONS 1-9: 2
1. LITTLE INTEREST OR PLEASURE IN DOING THINGS: SEVERAL DAYS
SUM OF ALL RESPONSES TO PHQ QUESTIONS 1-9: 2
SUM OF ALL RESPONSES TO PHQ QUESTIONS 1-9: 2

## 2025-08-07 ASSESSMENT — LIFESTYLE VARIABLES
HOW OFTEN DO YOU HAVE A DRINK CONTAINING ALCOHOL: NEVER
HOW MANY STANDARD DRINKS CONTAINING ALCOHOL DO YOU HAVE ON A TYPICAL DAY: PATIENT DOES NOT DRINK